# Patient Record
Sex: MALE | Race: WHITE | Employment: OTHER | URBAN - METROPOLITAN AREA
[De-identification: names, ages, dates, MRNs, and addresses within clinical notes are randomized per-mention and may not be internally consistent; named-entity substitution may affect disease eponyms.]

---

## 2019-02-02 ENCOUNTER — APPOINTMENT (OUTPATIENT)
Dept: GENERAL RADIOLOGY | Facility: HOSPITAL | Age: 64
End: 2019-02-02
Payer: COMMERCIAL

## 2019-02-02 ENCOUNTER — HOSPITAL ENCOUNTER (OUTPATIENT)
Facility: HOSPITAL | Age: 64
Setting detail: OBSERVATION
Discharge: HOME | End: 2019-02-03
Attending: EMERGENCY MEDICINE | Admitting: HOSPITALIST
Payer: COMMERCIAL

## 2019-02-02 DIAGNOSIS — R07.9 ACUTE CHEST PAIN: Primary | ICD-10-CM

## 2019-02-02 LAB
ANION GAP SERPL CALC-SCNC: 11 MMOL/L (ref 0–18)
BASOPHILS # BLD AUTO: 0.03 X10(3) UL (ref 0–0.2)
BASOPHILS NFR BLD AUTO: 0.4 %
BILIRUB UR QL: NEGATIVE
BUN SERPL-MCNC: 19 MG/DL (ref 8–20)
BUN/CREAT SERPL: 18.1 (ref 10–20)
CALCIUM SERPL-MCNC: 9.7 MG/DL (ref 8.5–10.5)
CHLORIDE SERPL-SCNC: 100 MMOL/L (ref 95–110)
CLARITY UR: CLEAR
CO2 SERPL-SCNC: 23 MMOL/L (ref 22–32)
COLOR UR: YELLOW
CREAT SERPL-MCNC: 1.05 MG/DL (ref 0.5–1.5)
DEPRECATED RDW RBC AUTO: 44.7 FL (ref 35.1–46.3)
EOSINOPHIL # BLD AUTO: 0.06 X10(3) UL (ref 0–0.7)
EOSINOPHIL NFR BLD AUTO: 0.8 %
ERYTHROCYTE [DISTWIDTH] IN BLOOD BY AUTOMATED COUNT: 13.5 % (ref 11–15)
EST. AVERAGE GLUCOSE BLD GHB EST-MCNC: 131 MG/DL (ref 68–126)
GLUCOSE BLDC GLUCOMTR-MCNC: 100 MG/DL (ref 70–99)
GLUCOSE BLDC GLUCOMTR-MCNC: 100 MG/DL (ref 70–99)
GLUCOSE SERPL-MCNC: 143 MG/DL (ref 70–99)
GLUCOSE UR-MCNC: NEGATIVE MG/DL
HBA1C MFR BLD HPLC: 6.2 % (ref ?–5.7)
HCT VFR BLD AUTO: 43.8 % (ref 39–53)
HGB BLD-MCNC: 14.8 G/DL (ref 13–17.5)
HGB UR QL STRIP.AUTO: NEGATIVE
IMM GRANULOCYTES # BLD AUTO: 0.02 X10(3) UL (ref 0–1)
IMM GRANULOCYTES NFR BLD: 0.3 %
KETONES UR-MCNC: NEGATIVE MG/DL
LEUKOCYTE ESTERASE UR QL STRIP.AUTO: NEGATIVE
LYMPHOCYTES # BLD AUTO: 1.54 X10(3) UL (ref 1–4)
LYMPHOCYTES NFR BLD AUTO: 21.3 %
MCH RBC QN AUTO: 30.5 PG (ref 26–34)
MCHC RBC AUTO-ENTMCNC: 33.8 G/DL (ref 31–37)
MCV RBC AUTO: 90.1 FL (ref 80–100)
MONOCYTES # BLD AUTO: 0.48 X10(3) UL (ref 0.1–1)
MONOCYTES NFR BLD AUTO: 6.6 %
NEUTROPHILS # BLD AUTO: 5.09 X10 (3) UL (ref 1.5–7.7)
NEUTROPHILS # BLD AUTO: 5.09 X10(3) UL (ref 1.5–7.7)
NEUTROPHILS NFR BLD AUTO: 70.6 %
NITRITE UR QL STRIP.AUTO: NEGATIVE
OSMOLALITY UR CALC.SUM OF ELEC: 283 MOSM/KG (ref 275–295)
PH UR: 5 [PH] (ref 5–8)
PLATELET # BLD AUTO: 212 10(3)UL (ref 150–450)
POTASSIUM SERPL-SCNC: 4.1 MMOL/L (ref 3.3–5.1)
PROT UR-MCNC: NEGATIVE MG/DL
RBC # BLD AUTO: 4.86 X10(6)UL (ref 3.8–5.8)
SODIUM SERPL-SCNC: 134 MMOL/L (ref 136–144)
SP GR UR STRIP: 1.02 (ref 1–1.03)
TROPONIN I SERPL-MCNC: 0 NG/ML (ref ?–0.03)
TROPONIN I SERPL-MCNC: 0 NG/ML (ref ?–0.03)
UROBILINOGEN UR STRIP-ACNC: <2
VIT C UR-MCNC: NEGATIVE MG/DL
WBC # BLD AUTO: 7.2 X10(3) UL (ref 4–11)

## 2019-02-02 PROCEDURE — 80048 BASIC METABOLIC PNL TOTAL CA: CPT

## 2019-02-02 PROCEDURE — 99285 EMERGENCY DEPT VISIT HI MDM: CPT

## 2019-02-02 PROCEDURE — 93010 ELECTROCARDIOGRAM REPORT: CPT | Performed by: EMERGENCY MEDICINE

## 2019-02-02 PROCEDURE — 36415 COLL VENOUS BLD VENIPUNCTURE: CPT

## 2019-02-02 PROCEDURE — 82962 GLUCOSE BLOOD TEST: CPT

## 2019-02-02 PROCEDURE — 84484 ASSAY OF TROPONIN QUANT: CPT | Performed by: EMERGENCY MEDICINE

## 2019-02-02 PROCEDURE — 71045 X-RAY EXAM CHEST 1 VIEW: CPT

## 2019-02-02 PROCEDURE — 80048 BASIC METABOLIC PNL TOTAL CA: CPT | Performed by: EMERGENCY MEDICINE

## 2019-02-02 PROCEDURE — 83036 HEMOGLOBIN GLYCOSYLATED A1C: CPT | Performed by: HOSPITALIST

## 2019-02-02 PROCEDURE — 84484 ASSAY OF TROPONIN QUANT: CPT

## 2019-02-02 PROCEDURE — 93005 ELECTROCARDIOGRAM TRACING: CPT

## 2019-02-02 PROCEDURE — 85025 COMPLETE CBC W/AUTO DIFF WBC: CPT

## 2019-02-02 PROCEDURE — 96372 THER/PROPH/DIAG INJ SC/IM: CPT

## 2019-02-02 PROCEDURE — 81003 URINALYSIS AUTO W/O SCOPE: CPT | Performed by: EMERGENCY MEDICINE

## 2019-02-02 PROCEDURE — 85025 COMPLETE CBC W/AUTO DIFF WBC: CPT | Performed by: EMERGENCY MEDICINE

## 2019-02-02 RX ORDER — ENOXAPARIN SODIUM 100 MG/ML
40 INJECTION SUBCUTANEOUS NIGHTLY
Status: DISCONTINUED | OUTPATIENT
Start: 2019-02-02 | End: 2019-02-03

## 2019-02-02 RX ORDER — ATORVASTATIN CALCIUM 10 MG/1
10 TABLET, FILM COATED ORAL NIGHTLY
Status: DISCONTINUED | OUTPATIENT
Start: 2019-02-02 | End: 2019-02-03

## 2019-02-02 RX ORDER — SERTRALINE HYDROCHLORIDE 100 MG/1
100 TABLET, FILM COATED ORAL DAILY
Status: DISCONTINUED | OUTPATIENT
Start: 2019-02-03 | End: 2019-02-03

## 2019-02-02 RX ORDER — SODIUM CHLORIDE 0.9 % (FLUSH) 0.9 %
3 SYRINGE (ML) INJECTION AS NEEDED
Status: DISCONTINUED | OUTPATIENT
Start: 2019-02-02 | End: 2019-02-03

## 2019-02-02 RX ORDER — SERTRALINE HYDROCHLORIDE 100 MG/1
100 TABLET, FILM COATED ORAL DAILY
COMMUNITY

## 2019-02-02 RX ORDER — ASPIRIN 81 MG/1
324 TABLET, CHEWABLE ORAL ONCE
Status: COMPLETED | OUTPATIENT
Start: 2019-02-02 | End: 2019-02-02

## 2019-02-02 RX ORDER — ALFUZOSIN HYDROCHLORIDE 10 MG/1
10 TABLET, EXTENDED RELEASE ORAL
Status: DISCONTINUED | OUTPATIENT
Start: 2019-02-03 | End: 2019-02-03

## 2019-02-02 RX ORDER — TAMSULOSIN HYDROCHLORIDE 0.4 MG/1
0.4 CAPSULE ORAL DAILY
Status: ON HOLD | COMMUNITY
End: 2019-03-19

## 2019-02-02 RX ORDER — ZOLPIDEM TARTRATE 5 MG/1
10 TABLET ORAL NIGHTLY
Status: DISCONTINUED | OUTPATIENT
Start: 2019-02-02 | End: 2019-02-03

## 2019-02-02 RX ORDER — DEXTROSE MONOHYDRATE 25 G/50ML
50 INJECTION, SOLUTION INTRAVENOUS AS NEEDED
Status: DISCONTINUED | OUTPATIENT
Start: 2019-02-02 | End: 2019-02-03

## 2019-02-02 RX ORDER — ATORVASTATIN CALCIUM 10 MG/1
10 TABLET, FILM COATED ORAL NIGHTLY
Status: ON HOLD | COMMUNITY
End: 2019-04-24

## 2019-02-02 RX ORDER — ZOLPIDEM TARTRATE 10 MG/1
10 TABLET ORAL NIGHTLY
COMMUNITY

## 2019-02-02 RX ORDER — TRAMADOL HYDROCHLORIDE 50 MG/1
50 TABLET ORAL EVERY 6 HOURS PRN
Status: DISCONTINUED | OUTPATIENT
Start: 2019-02-02 | End: 2019-02-03

## 2019-02-02 RX ORDER — FINASTERIDE 5 MG/1
5 TABLET, FILM COATED ORAL DAILY
Status: DISCONTINUED | OUTPATIENT
Start: 2019-02-03 | End: 2019-02-03

## 2019-02-02 RX ORDER — ENALAPRIL MALEATE 20 MG/1
20 TABLET ORAL 2 TIMES DAILY
Status: DISCONTINUED | OUTPATIENT
Start: 2019-02-02 | End: 2019-02-03

## 2019-02-02 NOTE — ED NOTES
Pt here today for upper and mid anterior wall chest pain and tightness for the last few hours. Pt states he has been moving a lot of heavy bags of salt and amina decorations when he began to have chest pain, dizziness, SOB.  Pt states he had angio last

## 2019-02-02 NOTE — H&P
[facility] HOSPITALIST HISTORY AND PHYSICAL     CC: Patient presents with:  Chest Pain Angina (cardiovascular)       PCP: Kelly Gonzalez MD- non [facility]    History of Present Illness:   Patient is a 70year old male with PMH sig for CAD?  (pt unable to te affect      LABS:   Lab Results   Component Value Date    WBC 7.2 02/02/2019    HGB 14.8 02/02/2019    HCT 43.8 02/02/2019    .0 02/02/2019    CREATSERUM 1.05 02/02/2019    BUN 19 02/02/2019     02/02/2019    K 4.1 02/02/2019     02/02/2 alternating sx constipation and loose stools at bsl      Outpatient records or previous hospital records reviewed as detailed above.     [facility] hospitalist to continue to follow patient while in house        Leonides Newton  [facility] Hospitalist  Pager [phone]    2

## 2019-02-02 NOTE — ED INITIAL ASSESSMENT (HPI)
Mid-sternal chest tightness and difficulty breathing x1-2 hours. States he has been working hard this week clearing snow and putting away [employer].

## 2019-02-02 NOTE — ED PROVIDER NOTES
Patient Seen in: [facility] Emergency Department    History   Patient presents with:  Chest Pain Angina (cardiovascular)    Stated Complaint: SOB    HPI    68-year-old male with past medical history of coronary artery disease hypertension diabetes Drug use: Not on file      Review of Systems   Constitutional: Negative. HENT: Negative. Eyes: Negative. Respiratory: Positive for shortness of breath. Cardiovascular: Positive for chest pain. Gastrointestinal: Negative.     Genitourinary: Po mood and affect. His speech is normal and behavior is normal.   Nursing note and vitals reviewed.             ED Course     Labs Reviewed   BASIC METABOLIC PANEL (8) - Abnormal; Notable for the following components:       Result Value    Glucose 143 (*) COMPARISON: None. INDICATIONS: Shortness of breath and a chest pain  for 2 hours  TECHNIQUE:   Single view. FINDINGS:  CARDIAC/VASC: Normal.  No cardiac silhouette abnormality or cardiomegaly. Unremarkable pulmonary vasculature.   MEDIAST/EARLE:   No vis

## 2019-02-03 ENCOUNTER — APPOINTMENT (OUTPATIENT)
Dept: NUCLEAR MEDICINE | Facility: HOSPITAL | Age: 64
End: 2019-02-03
Attending: HOSPITALIST
Payer: COMMERCIAL

## 2019-02-03 ENCOUNTER — APPOINTMENT (OUTPATIENT)
Dept: CV DIAGNOSTICS | Facility: HOSPITAL | Age: 64
End: 2019-02-03
Attending: HOSPITALIST
Payer: COMMERCIAL

## 2019-02-03 VITALS
HEART RATE: 58 BPM | TEMPERATURE: 98 F | HEIGHT: 70 IN | SYSTOLIC BLOOD PRESSURE: 124 MMHG | DIASTOLIC BLOOD PRESSURE: 65 MMHG | RESPIRATION RATE: 18 BRPM | OXYGEN SATURATION: 97 % | WEIGHT: 206.88 LBS | BODY MASS INDEX: 29.62 KG/M2

## 2019-02-03 LAB
ANION GAP SERPL CALC-SCNC: 10 MMOL/L (ref 0–18)
BUN SERPL-MCNC: 14 MG/DL (ref 8–20)
BUN/CREAT SERPL: 13 (ref 10–20)
CALCIUM SERPL-MCNC: 9.5 MG/DL (ref 8.5–10.5)
CHLORIDE SERPL-SCNC: 101 MMOL/L (ref 95–110)
CO2 SERPL-SCNC: 26 MMOL/L (ref 22–32)
CREAT SERPL-MCNC: 1.08 MG/DL (ref 0.5–1.5)
GLUCOSE BLDC GLUCOMTR-MCNC: 101 MG/DL (ref 70–99)
GLUCOSE SERPL-MCNC: 111 MG/DL (ref 70–99)
OSMOLALITY UR CALC.SUM OF ELEC: 285 MOSM/KG (ref 275–295)
POTASSIUM SERPL-SCNC: 4.4 MMOL/L (ref 3.3–5.1)
SODIUM SERPL-SCNC: 137 MMOL/L (ref 136–144)

## 2019-02-03 PROCEDURE — 94660 CPAP INITIATION&MGMT: CPT

## 2019-02-03 PROCEDURE — 93018 CV STRESS TEST I&R ONLY: CPT | Performed by: HOSPITALIST

## 2019-02-03 PROCEDURE — 93017 CV STRESS TEST TRACING ONLY: CPT | Performed by: HOSPITALIST

## 2019-02-03 PROCEDURE — 80048 BASIC METABOLIC PNL TOTAL CA: CPT | Performed by: HOSPITALIST

## 2019-02-03 PROCEDURE — 78452 HT MUSCLE IMAGE SPECT MULT: CPT | Performed by: HOSPITALIST

## 2019-02-03 PROCEDURE — 82962 GLUCOSE BLOOD TEST: CPT

## 2019-02-03 PROCEDURE — 93016 CV STRESS TEST SUPVJ ONLY: CPT | Performed by: HOSPITALIST

## 2019-02-03 RX ORDER — 0.9 % SODIUM CHLORIDE 0.9 %
VIAL (ML) INJECTION
Status: COMPLETED
Start: 2019-02-03 | End: 2019-02-03

## 2019-02-03 NOTE — PLAN OF CARE
Ok to discharge patient home. Discharge instructions explained to patient. Tele and IV discontinued. Patient sent home with family.

## 2019-02-03 NOTE — DISCHARGE SUMMARY
[facility] Internal Medicine Discharge Summary   Patient ID:  Marii Hollins  [id number]  89 year old  6/24/1955    Admit date: 2/2/2019    Discharge date and time: 2/3/2019     Attending Physician: Madison Fofana MD     Primary Care Physician: Kelly Borjas as:   ZOLOFT     tamsulosin HCl 0.4 MG Caps  Commonly known as:  FLOMAX     VERAPAMIL HCL ER OR     Zolpidem Tartrate 10 MG Tabs  Commonly known as:  AMBIEN            Consults: IP CONSULT TO HOSPITALIST    Radiology: Xr Chest Ap Portable  (cpt=71045)    Re

## 2019-02-07 ENCOUNTER — OFFICE VISIT (OUTPATIENT)
Dept: SURGERY | Facility: CLINIC | Age: 64
End: 2019-02-07
Payer: COMMERCIAL

## 2019-02-07 ENCOUNTER — TELEPHONE (OUTPATIENT)
Dept: SURGERY | Facility: CLINIC | Age: 64
End: 2019-02-07

## 2019-02-07 VITALS
SYSTOLIC BLOOD PRESSURE: 108 MMHG | TEMPERATURE: 98 F | HEART RATE: 72 BPM | DIASTOLIC BLOOD PRESSURE: 66 MMHG | WEIGHT: 208 LBS | BODY MASS INDEX: 29.78 KG/M2 | HEIGHT: 70 IN

## 2019-02-07 DIAGNOSIS — R35.0 URINARY FREQUENCY: ICD-10-CM

## 2019-02-07 DIAGNOSIS — R35.1 BENIGN PROSTATIC HYPERPLASIA WITH NOCTURIA: Primary | ICD-10-CM

## 2019-02-07 DIAGNOSIS — R30.0 DYSURIA: ICD-10-CM

## 2019-02-07 DIAGNOSIS — N40.1 BENIGN PROSTATIC HYPERPLASIA WITH NOCTURIA: Primary | ICD-10-CM

## 2019-02-07 DIAGNOSIS — R39.12 WEAK URINARY STREAM: ICD-10-CM

## 2019-02-07 DIAGNOSIS — R39.13 INTERMITTENT URINARY STREAM: ICD-10-CM

## 2019-02-07 DIAGNOSIS — R82.90 URINE FINDING: ICD-10-CM

## 2019-02-07 DIAGNOSIS — R39.15 URINARY URGENCY: ICD-10-CM

## 2019-02-07 PROBLEM — E11.9 TYPE 2 DIABETES MELLITUS (HCC): Status: ACTIVE | Noted: 2019-02-07

## 2019-02-07 PROBLEM — E78.5 HYPERLIPIDEMIA: Status: ACTIVE | Noted: 2019-02-07

## 2019-02-07 LAB
APPEARANCE: CLEAR
BACTERIA UR QL AUTO: NEGATIVE /HPF
BILIRUB UR QL: NEGATIVE
CLARITY UR: CLEAR
COLOR UR: YELLOW
GLUCOSE UR-MCNC: NEGATIVE MG/DL
KETONES UR-MCNC: NEGATIVE MG/DL
LEUKOCYTE ESTERASE UR QL STRIP.AUTO: NEGATIVE
MULTISTIX LOT#: NORMAL NUMERIC
NITRITE UR QL STRIP.AUTO: NEGATIVE
PH UR: 5 [PH] (ref 5–8)
PH, URINE: 5 (ref 4.5–8)
PROT UR-MCNC: NEGATIVE MG/DL
RBC #/AREA URNS AUTO: <1 /HPF
SP GR UR STRIP: 1.02 (ref 1–1.03)
SPECIFIC GRAVITY: 1.01 (ref 1–1.03)
URINE-COLOR: YELLOW
UROBILINOGEN UR STRIP-ACNC: <2
UROBILINOGEN,SEMI-QN: 0.2 MG/DL (ref 0–1.9)
VIT C UR-MCNC: NEGATIVE MG/DL
WBC #/AREA URNS AUTO: <1 /HPF

## 2019-02-07 PROCEDURE — 81003 URINALYSIS AUTO W/O SCOPE: CPT | Performed by: NURSE PRACTITIONER

## 2019-02-07 PROCEDURE — 99244 OFF/OP CNSLTJ NEW/EST MOD 40: CPT | Performed by: NURSE PRACTITIONER

## 2019-02-07 NOTE — TELEPHONE ENCOUNTER
Called patients PCP Dr. Juhi Ventura, and spoke with Enriqueta. Requested PSA results and gave [facility] THE [facility] Fax#. Received fax results and one copy placed in scan bin and second copy given to [address] [place] [address].

## 2019-02-07 NOTE — PROGRESS NOTES
HPI:    Patient ID: Julián Honeycutt is a 70year old male. Patient is a 70year old male who presents to the clinic for a consult regarding urinary frequency. Past medical history of diabetes mellitus type 2, CAD, hypertension, and BPH.     Patient state Negative for abdominal pain, constipation and diarrhea. Genitourinary: Positive for difficulty urinating, dysuria, frequency, penile pain and urgency. Negative for flank pain, hematuria, scrotal swelling and testicular pain.    Musculoskeletal: Negative f normal. No respiratory distress. Abdominal: Soft. He exhibits no distension. Genitourinary:   Genitourinary Comments: Circumcised penis normal, no swelling or erythema.     Urethral opening normal.  No scrotal swelling  Bilateral testes palpable and nor

## 2019-02-08 ENCOUNTER — TELEPHONE (OUTPATIENT)
Dept: SURGERY | Facility: CLINIC | Age: 64
End: 2019-02-08

## 2019-02-08 NOTE — TELEPHONE ENCOUNTER
----- Message from WARREN Tan sent at 2/8/2019  8:48 AM CST -----  Staff,    Please let patient know his UA is normal.  Microscopic evaluation does not show an abnormal amount of RBCs. Continue with current plans for uroflow.      Thank you,

## 2019-02-22 ENCOUNTER — TELEPHONE (OUTPATIENT)
Dept: SURGERY | Facility: CLINIC | Age: 64
End: 2019-02-22

## 2019-02-22 NOTE — TELEPHONE ENCOUNTER
Patient contacted. Offered sooner appt for Uroflow/bladder US on Wed @ 10:30 am. Patient agreed, appointment changed.

## 2019-02-22 NOTE — TELEPHONE ENCOUNTER
Pt states that he is having problems urinating, and wants to know if he can move his 3/13/19 Uroflow Bladder US appt. To a sooner date?

## 2019-02-27 ENCOUNTER — OFFICE VISIT (OUTPATIENT)
Dept: SURGERY | Facility: CLINIC | Age: 64
End: 2019-02-27
Payer: MEDICARE

## 2019-02-27 DIAGNOSIS — R39.15 URINARY URGENCY: ICD-10-CM

## 2019-02-27 DIAGNOSIS — R35.0 URINARY FREQUENCY: ICD-10-CM

## 2019-02-27 DIAGNOSIS — R39.12 BENIGN PROSTATIC HYPERPLASIA WITH WEAK URINARY STREAM: Primary | ICD-10-CM

## 2019-02-27 DIAGNOSIS — N40.1 BENIGN PROSTATIC HYPERPLASIA WITH WEAK URINARY STREAM: Primary | ICD-10-CM

## 2019-02-27 PROCEDURE — 99212 OFFICE O/P EST SF 10 MIN: CPT | Performed by: NURSE PRACTITIONER

## 2019-02-27 PROCEDURE — G0463 HOSPITAL OUTPT CLINIC VISIT: HCPCS | Performed by: NURSE PRACTITIONER

## 2019-02-27 NOTE — PROGRESS NOTES
HPI:    Patient ID: Lance Walker is a 70year old male. Patient presents to the clinic today for a follow up regarding voiding dysfunction.     Patient last seen in the office on 2/7/19 with complaints of urinary frequency, urgency, dysuria, and inter history on file.    Social History: Social History    Tobacco Use      Smoking status: Former Smoker        Years: 20.00        Types: Cigarettes        Quit date:         Years since quittin.1      Smokeless tobacco: Never Used    Alcohol use: Not placed in this encounter.       Meds This Visit:  Requested Prescriptions      No prescriptions requested or ordered in this encounter       Imaging & Referrals:  None        FA#[id number]

## 2019-03-07 ENCOUNTER — OFFICE VISIT (OUTPATIENT)
Dept: SURGERY | Facility: CLINIC | Age: 64
End: 2019-03-07
Payer: MEDICARE

## 2019-03-07 ENCOUNTER — TELEPHONE (OUTPATIENT)
Dept: SURGERY | Facility: CLINIC | Age: 64
End: 2019-03-07

## 2019-03-07 DIAGNOSIS — R39.12 BENIGN PROSTATIC HYPERPLASIA WITH WEAK URINARY STREAM: Primary | ICD-10-CM

## 2019-03-07 DIAGNOSIS — N40.1 BENIGN PROSTATIC HYPERPLASIA WITH WEAK URINARY STREAM: Primary | ICD-10-CM

## 2019-03-07 DIAGNOSIS — R35.0 URINARY FREQUENCY: ICD-10-CM

## 2019-03-07 DIAGNOSIS — R39.15 URINARY URGENCY: ICD-10-CM

## 2019-03-07 PROCEDURE — 51798 US URINE CAPACITY MEASURE: CPT | Performed by: NURSE PRACTITIONER

## 2019-03-07 PROCEDURE — 99213 OFFICE O/P EST LOW 20 MIN: CPT | Performed by: NURSE PRACTITIONER

## 2019-03-07 PROCEDURE — 51741 ELECTRO-UROFLOWMETRY FIRST: CPT | Performed by: NURSE PRACTITIONER

## 2019-03-07 NOTE — TELEPHONE ENCOUNTER
Called patient in regards to appointment scheduled for uroflow today @ at 1pm no answer left message to come in at 130pm due to there being another patient scheduled at 1pm also.

## 2019-03-07 NOTE — TELEPHONE ENCOUNTER
PT seen if office today for Uroflow with [address]. PT following up with Fredy Metz 3/11 to discuss further options. Copy of uroflow results in black folder.      Future Appointments   Date Time Provider Marielle Schafer   3/11/2019  2:00 PM Patria Schmidt MD no

## 2019-03-07 NOTE — PROGRESS NOTES
HPI:    Patient ID: Juliet Wang is a 70year old male. Patient is a 70year old male who presents to the clinic for a uroflow and bladder ultrasound. Patient was previously seen in the clinic 2/7/19 with complaints of voiding dysfunction.   He c FRACTURE SURGERY      R heel      No family history on file.    Social History: Social History    Tobacco Use      Smoking status: Former Smoker        Years: 20.00        Types: Cigarettes        Quit date:         Years since quittin.      Smoke this encounter       Imaging & Referrals:  None        OE#[id number]

## 2019-03-11 ENCOUNTER — APPOINTMENT (OUTPATIENT)
Dept: LAB | Facility: HOSPITAL | Age: 64
End: 2019-03-11
Attending: UROLOGY
Payer: COMMERCIAL

## 2019-03-11 ENCOUNTER — OFFICE VISIT (OUTPATIENT)
Dept: SURGERY | Facility: CLINIC | Age: 64
End: 2019-03-11
Payer: MEDICARE

## 2019-03-11 VITALS
BODY MASS INDEX: 30.35 KG/M2 | TEMPERATURE: 98 F | DIASTOLIC BLOOD PRESSURE: 73 MMHG | HEIGHT: 70 IN | WEIGHT: 212 LBS | HEART RATE: 83 BPM | SYSTOLIC BLOOD PRESSURE: 143 MMHG

## 2019-03-11 DIAGNOSIS — N40.1 BENIGN PROSTATIC HYPERPLASIA WITH LOWER URINARY TRACT SYMPTOMS, SYMPTOM DETAILS UNSPECIFIED: Primary | ICD-10-CM

## 2019-03-11 DIAGNOSIS — N40.1 BENIGN PROSTATIC HYPERPLASIA WITH LOWER URINARY TRACT SYMPTOMS, SYMPTOM DETAILS UNSPECIFIED: ICD-10-CM

## 2019-03-11 PROCEDURE — G0463 HOSPITAL OUTPT CLINIC VISIT: HCPCS | Performed by: UROLOGY

## 2019-03-11 PROCEDURE — 99204 OFFICE O/P NEW MOD 45 MIN: CPT | Performed by: UROLOGY

## 2019-03-11 PROCEDURE — 36415 COLL VENOUS BLD VENIPUNCTURE: CPT

## 2019-03-11 PROCEDURE — 84153 ASSAY OF PSA TOTAL: CPT

## 2019-03-11 NOTE — PROGRESS NOTES
SUBJECTIVE:  Annemarie Ortega is a 70year old male who presents for a consultation at the request of, and a copy of this note will be sent to, [facility], for evaluation of  benign prostatic hyperplasia. He states that the problem is unchanged.  Viewbix pleurisy. CARDIOVASCULAR:  Negative for pain or chest discomfort, dizziness or fainting, palpitations, leg swelling, nocturia, or claudication.   GASTROINTESTINAL:  Negative for nausea, vomiting, diarrhea, constipation, heartburn or indigestion, abdominal discussed with the patient and his wife and they understand.     Meds This Visit:  Requested Prescriptions      No prescriptions requested or ordered in this encounter       Imaging & Referrals:  None

## 2019-03-18 ENCOUNTER — APPOINTMENT (OUTPATIENT)
Dept: CT IMAGING | Facility: HOSPITAL | Age: 64
End: 2019-03-18
Attending: EMERGENCY MEDICINE
Payer: COMMERCIAL

## 2019-03-18 ENCOUNTER — APPOINTMENT (OUTPATIENT)
Dept: GENERAL RADIOLOGY | Facility: HOSPITAL | Age: 64
End: 2019-03-18
Attending: EMERGENCY MEDICINE
Payer: COMMERCIAL

## 2019-03-18 ENCOUNTER — APPOINTMENT (OUTPATIENT)
Dept: ULTRASOUND IMAGING | Facility: HOSPITAL | Age: 64
End: 2019-03-18
Attending: Other
Payer: COMMERCIAL

## 2019-03-18 ENCOUNTER — HOSPITAL ENCOUNTER (OUTPATIENT)
Facility: HOSPITAL | Age: 64
Setting detail: OBSERVATION
Discharge: HOME | End: 2019-03-19
Attending: EMERGENCY MEDICINE | Admitting: HOSPITALIST
Payer: COMMERCIAL

## 2019-03-18 ENCOUNTER — APPOINTMENT (OUTPATIENT)
Dept: MRI IMAGING | Facility: HOSPITAL | Age: 64
End: 2019-03-18
Attending: Other
Payer: COMMERCIAL

## 2019-03-18 DIAGNOSIS — R53.1 WEAKNESS: Primary | ICD-10-CM

## 2019-03-18 DIAGNOSIS — R11.0 NAUSEA: ICD-10-CM

## 2019-03-18 DIAGNOSIS — R33.9 URINARY RETENTION: ICD-10-CM

## 2019-03-18 PROBLEM — E87.1 HYPONATREMIA: Status: ACTIVE | Noted: 2019-03-18

## 2019-03-18 PROBLEM — R73.9 HYPERGLYCEMIA: Status: ACTIVE | Noted: 2019-03-18

## 2019-03-18 LAB
ANION GAP SERPL CALC-SCNC: 8 MMOL/L (ref 0–18)
BASOPHILS # BLD AUTO: 0.04 X10(3) UL (ref 0–0.2)
BASOPHILS NFR BLD AUTO: 0.5 %
BILIRUB UR QL: NEGATIVE
BUN BLD-MCNC: 11 MG/DL (ref 7–18)
BUN/CREAT SERPL: 11.3 (ref 10–20)
CALCIUM BLD-MCNC: 9.3 MG/DL (ref 8.5–10.1)
CHLORIDE SERPL-SCNC: 103 MMOL/L (ref 98–107)
CHOLEST SMN-MCNC: 112 MG/DL (ref ?–200)
CK SERPL-CCNC: 112 U/L (ref 39–308)
CLARITY UR: CLEAR
CO2 SERPL-SCNC: 24 MMOL/L (ref 21–32)
COLOR UR: YELLOW
CREAT BLD-MCNC: 0.97 MG/DL (ref 0.7–1.3)
DEPRECATED RDW RBC AUTO: 42.1 FL (ref 35.1–46.3)
EOSINOPHIL # BLD AUTO: 0.04 X10(3) UL (ref 0–0.7)
EOSINOPHIL NFR BLD AUTO: 0.5 %
ERYTHROCYTE [DISTWIDTH] IN BLOOD BY AUTOMATED COUNT: 13.2 % (ref 11–15)
EST. AVERAGE GLUCOSE BLD GHB EST-MCNC: 131 MG/DL (ref 68–126)
GLUCOSE BLD-MCNC: 136 MG/DL (ref 70–99)
GLUCOSE BLDC GLUCOMTR-MCNC: 137 MG/DL (ref 70–99)
GLUCOSE BLDC GLUCOMTR-MCNC: 96 MG/DL (ref 70–99)
GLUCOSE BLDC GLUCOMTR-MCNC: 98 MG/DL (ref 70–99)
GLUCOSE UR-MCNC: NEGATIVE MG/DL
HBA1C MFR BLD HPLC: 6.2 % (ref ?–5.7)
HCT VFR BLD AUTO: 42.6 % (ref 39–53)
HDLC SERPL-MCNC: 52 MG/DL (ref 40–59)
HGB BLD-MCNC: 15.2 G/DL (ref 13–17.5)
HGB UR QL STRIP.AUTO: NEGATIVE
IMM GRANULOCYTES # BLD AUTO: 0.03 X10(3) UL (ref 0–1)
IMM GRANULOCYTES NFR BLD: 0.4 %
INR BLD: 1.09 (ref 0.9–1.2)
LDLC SERPL CALC-MCNC: 37 MG/DL (ref ?–100)
LEUKOCYTE ESTERASE UR QL STRIP.AUTO: NEGATIVE
LYMPHOCYTES # BLD AUTO: 1.6 X10(3) UL (ref 1–4)
LYMPHOCYTES NFR BLD AUTO: 20.3 %
MCH RBC QN AUTO: 31.1 PG (ref 26–34)
MCHC RBC AUTO-ENTMCNC: 35.7 G/DL (ref 31–37)
MCV RBC AUTO: 87.1 FL (ref 80–100)
MONOCYTES # BLD AUTO: 0.65 X10(3) UL (ref 0.1–1)
MONOCYTES NFR BLD AUTO: 8.2 %
NEUTROPHILS # BLD AUTO: 5.52 X10 (3) UL (ref 1.5–7.7)
NEUTROPHILS # BLD AUTO: 5.52 X10(3) UL (ref 1.5–7.7)
NEUTROPHILS NFR BLD AUTO: 70.1 %
NITRITE UR QL STRIP.AUTO: NEGATIVE
NONHDLC SERPL-MCNC: 60 MG/DL (ref ?–130)
OSMOLALITY SERPL CALC.SUM OF ELEC: 281 MOSM/KG (ref 275–295)
PH UR: 5 [PH] (ref 5–8)
PLATELET # BLD AUTO: 228 10(3)UL (ref 150–450)
POTASSIUM SERPL-SCNC: 3.8 MMOL/L (ref 3.5–5.1)
PROT UR-MCNC: NEGATIVE MG/DL
PROTHROMBIN TIME: 13.9 SECONDS (ref 11.8–14.5)
RBC # BLD AUTO: 4.89 X10(6)UL (ref 3.8–5.8)
SODIUM SERPL-SCNC: 135 MMOL/L (ref 136–145)
SP GR UR STRIP: 1.01 (ref 1–1.03)
TRIGL SERPL-MCNC: 113 MG/DL (ref 30–149)
TROPONIN I SERPL-MCNC: <0.045 NG/ML (ref ?–0.04)
TSI SER-ACNC: 2.19 MIU/ML (ref 0.36–3.74)
UROBILINOGEN UR STRIP-ACNC: <2
VIT B12 SERPL-MCNC: 152 PG/ML (ref 193–986)
VIT C UR-MCNC: NEGATIVE MG/DL
VLDLC SERPL CALC-MCNC: 23 MG/DL (ref 0–30)
WBC # BLD AUTO: 7.9 X10(3) UL (ref 4–11)

## 2019-03-18 PROCEDURE — 70551 MRI BRAIN STEM W/O DYE: CPT | Performed by: OTHER

## 2019-03-18 PROCEDURE — 71045 X-RAY EXAM CHEST 1 VIEW: CPT | Performed by: EMERGENCY MEDICINE

## 2019-03-18 PROCEDURE — 99203 OFFICE O/P NEW LOW 30 MIN: CPT | Performed by: OTHER

## 2019-03-18 PROCEDURE — 72141 MRI NECK SPINE W/O DYE: CPT | Performed by: OTHER

## 2019-03-18 PROCEDURE — 70450 CT HEAD/BRAIN W/O DYE: CPT | Performed by: EMERGENCY MEDICINE

## 2019-03-18 PROCEDURE — 93880 EXTRACRANIAL BILAT STUDY: CPT | Performed by: OTHER

## 2019-03-18 RX ORDER — ONDANSETRON 2 MG/ML
4 INJECTION INTRAMUSCULAR; INTRAVENOUS EVERY 6 HOURS PRN
Status: DISCONTINUED | OUTPATIENT
Start: 2019-03-18 | End: 2019-03-19

## 2019-03-18 RX ORDER — ENALAPRIL MALEATE 20 MG/1
20 TABLET ORAL 2 TIMES DAILY
Status: DISCONTINUED | OUTPATIENT
Start: 2019-03-18 | End: 2019-03-19

## 2019-03-18 RX ORDER — ZOLPIDEM TARTRATE 10 MG/1
10 TABLET ORAL NIGHTLY
Status: DISCONTINUED | OUTPATIENT
Start: 2019-03-18 | End: 2019-03-19

## 2019-03-18 RX ORDER — SERTRALINE HYDROCHLORIDE 100 MG/1
100 TABLET, FILM COATED ORAL DAILY
Status: DISCONTINUED | OUTPATIENT
Start: 2019-03-18 | End: 2019-03-19

## 2019-03-18 RX ORDER — SENNOSIDES 8.6 MG
17.2 TABLET ORAL NIGHTLY
Status: DISCONTINUED | OUTPATIENT
Start: 2019-03-18 | End: 2019-03-19

## 2019-03-18 RX ORDER — VERAPAMIL HYDROCHLORIDE 240 MG/1
240 TABLET, FILM COATED, EXTENDED RELEASE ORAL DAILY
Status: DISCONTINUED | OUTPATIENT
Start: 2019-03-19 | End: 2019-03-19

## 2019-03-18 RX ORDER — ASPIRIN 325 MG
325 TABLET ORAL DAILY
Status: DISCONTINUED | OUTPATIENT
Start: 2019-03-18 | End: 2019-03-19

## 2019-03-18 RX ORDER — HEPARIN SODIUM 5000 [USP'U]/ML
5000 INJECTION, SOLUTION INTRAVENOUS; SUBCUTANEOUS EVERY 12 HOURS SCHEDULED
Status: DISCONTINUED | OUTPATIENT
Start: 2019-03-18 | End: 2019-03-19

## 2019-03-18 RX ORDER — FINASTERIDE 5 MG/1
5 TABLET, FILM COATED ORAL DAILY
Status: DISCONTINUED | OUTPATIENT
Start: 2019-03-18 | End: 2019-03-19

## 2019-03-18 RX ORDER — LORAZEPAM 2 MG/ML
0.5 INJECTION INTRAMUSCULAR ONCE
Status: COMPLETED | OUTPATIENT
Start: 2019-03-18 | End: 2019-03-18

## 2019-03-18 RX ORDER — DEXTROSE MONOHYDRATE 25 G/50ML
50 INJECTION, SOLUTION INTRAVENOUS AS NEEDED
Status: DISCONTINUED | OUTPATIENT
Start: 2019-03-18 | End: 2019-03-19

## 2019-03-18 RX ORDER — ATORVASTATIN CALCIUM 10 MG/1
10 TABLET, FILM COATED ORAL NIGHTLY
Status: DISCONTINUED | OUTPATIENT
Start: 2019-03-18 | End: 2019-03-19

## 2019-03-18 RX ORDER — SODIUM CHLORIDE 0.9 % (FLUSH) 0.9 %
3 SYRINGE (ML) INJECTION AS NEEDED
Status: DISCONTINUED | OUTPATIENT
Start: 2019-03-18 | End: 2019-03-19

## 2019-03-18 RX ORDER — ONDANSETRON 2 MG/ML
4 INJECTION INTRAMUSCULAR; INTRAVENOUS ONCE
Status: COMPLETED | OUTPATIENT
Start: 2019-03-18 | End: 2019-03-18

## 2019-03-18 RX ORDER — ACETAMINOPHEN 325 MG/1
650 TABLET ORAL EVERY 6 HOURS PRN
Status: DISCONTINUED | OUTPATIENT
Start: 2019-03-18 | End: 2019-03-19

## 2019-03-18 NOTE — ED INITIAL ASSESSMENT (HPI)
Pt reports not feeling well he think that he is having a reaction to tamusolin medication that he has been on since December.  He states that experiences nausea, dizziness, weakness in bilateral legs, insomnia, and generalized body aches for the past 2 helen

## 2019-03-18 NOTE — OCCUPATIONAL THERAPY NOTE
OCCUPATIONAL THERAPY EVALUATION - INPATIENT     Room Number: 502/502-A  Evaluation Date: 3/18/2019  Type of Evaluation: Initial  Presenting Problem: (weakness, numbness)    Physician Order: IP Consult to Occupational Therapy  Reason for Therapy: ADL/IADL R heel       HOME SITUATION  Type of Home: House  Home Layout: One level  Lives With: Spouse                      Drives: Yes       Stairs in Home: 3STE  Use of Assistive Device(s): none    Prior Level of [place]: Prior to admission, patient was indep good    FUNCTIONAL ADL ASSESSMENT  Grooming: indep  Feeding: indep  Bathing: indep  Toileting: indep  Upper Extremity Dressing: indep  Lower Extremity Dressing: indep    Education Provided: Educated patient in role of OT and POC  Patient End of Session: Up

## 2019-03-18 NOTE — PHYSICAL THERAPY NOTE
PHYSICAL THERAPY EVALUATION - INPATIENT     Room Number: 502/502-A  Evaluation Date: 3/18/2019  Type of Evaluation: Initial   Physician Order: PT Eval and Treat    Presenting Problem: weakness and numbness  Reason for Therapy: Mobility Dysfunction and Dis RECOMMENDATIONS  PT Discharge Recommendations: Home(recommending initial assist and supervision from family)    PLAN    Patient has been evaluated and presents with no skilled Physical Therapy needs at this time.   Patient will be discharged from Physical T 80            AM-PAC '6-Clicks' INPATIENT SHORT FORM - BASIC MOBILITY  How much difficulty does the patient currently have. ..  -   Turning over in bed (including adjusting bedclothes, sheets and blankets)?: None   -   Sitting down on and standing up from a

## 2019-03-18 NOTE — CONSULTS
[facility] HOSP - [facility]    Report of Consultation    Duy Murcia Patient Status:  Observation    1955 MRN [id number]   Location [facility]5W Attending Ashley Flores MD   Hosp Day # 0 PCP Kelly Gonzalez MD     Date of Admissi ondansetron HCl (ZOFRAN) injection 4 mg 4 mg Intravenous Q6H PRN   atorvastatin (LIPITOR) tab 10 mg 10 mg Oral Nightly   Enalapril Maleate (VASOTEC) tab 20 mg 20 mg Oral BID   finasteride (PROSCAR) tab 5 mg 5 mg Oral Daily   Sertraline HCl (ZOLOFT) tab 1 no cyanosis or edema     Neurological:     Mental Status- Alert and oriented x3.   Normal attention span and concentration  Thought process intact  Memory intact- recent and remote  Mood intact, somewhat anxious  Fund of knowledge appropriate for education 3/18/2019 at 8:34     Approved by (CST): Neville Carr MD on 3/18/2019 at 8:39          Xr Chest Ap Portable  (cpt=71045)    Result Date: 3/18/2019  CONCLUSION:  1. No acute cardiopulmonary disease. 2. No significant change has occurred.      Dictated by (CS

## 2019-03-18 NOTE — ED PROVIDER NOTES
Patient Seen in: [address]    History   Patient presents with:  Dizziness (neurologic)    Stated Complaint: Urinary retention    HPI    Patient presents emergency department complaining of difficulty urinating as well as left-sided weakness and of motion. Neck supple. Cardiovascular: Normal rate and regular rhythm. No murmur heard. Pulmonary/Chest: Effort normal and breath sounds normal. No respiratory distress. Abdominal: Soft. He exhibits no distension. There is no tenderness.    Musculos -----------         ------                     CBC W/ DIFFERENTIAL[[id number]]                              Final result                 Please view results for these tests on the individual orders.    RAINBOW DRAW BLUE   RAINBOW DRAW Jess Bull 3/18/2019 at 14:43          Xr Chest Ap Portable  (cpt=71045)    Result Date: 3/18/2019  CONCLUSION:  1. No acute cardiopulmonary disease. 2. No significant change has occurred.      Dictated by (CST): Mary Kelley MD on 3/18/2019 at 9:07     Approved b

## 2019-03-18 NOTE — PLAN OF CARE
Problem: Patient Centered Care  Goal: Patient preferences are identified and integrated in the patient's plan of care  Interventions:  - What would you like us to know as we care for you? nothing  - Provide timely, complete, and accurate information to pat comfort measures as appropriate  - Advance diet as tolerated, if ordered  - Obtain nutritional consult as needed  - Evaluate fluid balance  Outcome: Progressing    Goal: Maintains adequate nutritional intake (undernourished)  INTERVENTIONS:  - Monitor perc NEUROLOGICAL - ADULT  Goal: Achieves stable or improved neurological status  INTERVENTIONS  - Assess for and report changes in neurological status  - Initiate measures to prevent increased intracranial pressure  - Maintain blood pressure and fluid volume w appropriate  - Consider OT/PT consult to assist with strengthening/mobility  - Encourage toileting schedule  Outcome: Progressing      Problem: Patient/Family Goals  Goal: Patient/Family Long Term Goal  Patient's Long Term Goal: get prostate problem fixed

## 2019-03-18 NOTE — H&P
[facility] Hospitalist H&P       CC: Patient presents with:  Dizziness (neurologic)       PCP: Kelly Gonzalez MD    History of Present Illness: 70year old male with PMH sig for CAD but recent normal stress (2/2019) HTN, DM, prior nicotine use quit Types: Cigarettes        Quit date: 12        Years since quittin.2      Smokeless tobacco: Never Used    Alcohol use: No      Frequency: Never       Fam Hx  History reviewed. No pertinent family history.     Review of Systems  Comprehensive ROS revi on 3/18/2019 at 8:39          Xr Chest Ap Portable  (cpt=71045)    Result Date: 3/18/2019  CONCLUSION:  1. No acute cardiopulmonary disease. 2. No significant change has occurred.      Dictated by (CST): Nadja Pina MD on 3/18/2019 at 9:07     Approved number: [id number]

## 2019-03-19 ENCOUNTER — APPOINTMENT (OUTPATIENT)
Dept: CV DIAGNOSTICS | Facility: HOSPITAL | Age: 64
End: 2019-03-19
Attending: Other
Payer: COMMERCIAL

## 2019-03-19 VITALS
TEMPERATURE: 98 F | HEIGHT: 70 IN | WEIGHT: 183.63 LBS | RESPIRATION RATE: 20 BRPM | DIASTOLIC BLOOD PRESSURE: 63 MMHG | HEART RATE: 64 BPM | BODY MASS INDEX: 26.29 KG/M2 | OXYGEN SATURATION: 97 % | SYSTOLIC BLOOD PRESSURE: 122 MMHG

## 2019-03-19 LAB
ANION GAP SERPL CALC-SCNC: 9 MMOL/L (ref 0–18)
BASOPHILS # BLD AUTO: 0.04 X10(3) UL (ref 0–0.2)
BASOPHILS NFR BLD AUTO: 0.5 %
BUN BLD-MCNC: 13 MG/DL (ref 7–18)
BUN/CREAT SERPL: 15.7 (ref 10–20)
CALCIUM BLD-MCNC: 9 MG/DL (ref 8.5–10.1)
CHLORIDE SERPL-SCNC: 103 MMOL/L (ref 98–107)
CO2 SERPL-SCNC: 24 MMOL/L (ref 21–32)
CREAT BLD-MCNC: 0.83 MG/DL (ref 0.7–1.3)
DEPRECATED RDW RBC AUTO: 43.7 FL (ref 35.1–46.3)
EOSINOPHIL # BLD AUTO: 0.13 X10(3) UL (ref 0–0.7)
EOSINOPHIL NFR BLD AUTO: 1.7 %
ERYTHROCYTE [DISTWIDTH] IN BLOOD BY AUTOMATED COUNT: 13.5 % (ref 11–15)
GLUCOSE BLD-MCNC: 106 MG/DL (ref 70–99)
GLUCOSE BLDC GLUCOMTR-MCNC: 102 MG/DL (ref 70–99)
GLUCOSE BLDC GLUCOMTR-MCNC: 117 MG/DL (ref 70–99)
GLUCOSE BLDC GLUCOMTR-MCNC: 88 MG/DL (ref 70–99)
HAV IGM SER QL: 1.8 MG/DL (ref 1.6–2.6)
HCT VFR BLD AUTO: 41.6 % (ref 39–53)
HGB BLD-MCNC: 14.1 G/DL (ref 13–17.5)
IMM GRANULOCYTES # BLD AUTO: 0.02 X10(3) UL (ref 0–1)
IMM GRANULOCYTES NFR BLD: 0.3 %
LYMPHOCYTES # BLD AUTO: 2.27 X10(3) UL (ref 1–4)
LYMPHOCYTES NFR BLD AUTO: 29.6 %
MCH RBC QN AUTO: 30.1 PG (ref 26–34)
MCHC RBC AUTO-ENTMCNC: 33.9 G/DL (ref 31–37)
MCV RBC AUTO: 88.7 FL (ref 80–100)
MONOCYTES # BLD AUTO: 0.69 X10(3) UL (ref 0.1–1)
MONOCYTES NFR BLD AUTO: 9 %
NEUTROPHILS # BLD AUTO: 4.53 X10 (3) UL (ref 1.5–7.7)
NEUTROPHILS # BLD AUTO: 4.53 X10(3) UL (ref 1.5–7.7)
NEUTROPHILS NFR BLD AUTO: 58.9 %
OSMOLALITY SERPL CALC.SUM OF ELEC: 283 MOSM/KG (ref 275–295)
PLATELET # BLD AUTO: 218 10(3)UL (ref 150–450)
POTASSIUM SERPL-SCNC: 3.8 MMOL/L (ref 3.5–5.1)
RBC # BLD AUTO: 4.69 X10(6)UL (ref 3.8–5.8)
SODIUM SERPL-SCNC: 136 MMOL/L (ref 136–145)
WBC # BLD AUTO: 7.7 X10(3) UL (ref 4–11)

## 2019-03-19 PROCEDURE — 93306 TTE W/DOPPLER COMPLETE: CPT | Performed by: OTHER

## 2019-03-19 PROCEDURE — 99222 1ST HOSP IP/OBS MODERATE 55: CPT | Performed by: NURSE PRACTITIONER

## 2019-03-19 PROCEDURE — 99214 OFFICE O/P EST MOD 30 MIN: CPT | Performed by: OTHER

## 2019-03-19 RX ORDER — POTASSIUM CHLORIDE 20 MEQ/1
40 TABLET, EXTENDED RELEASE ORAL ONCE
Status: COMPLETED | OUTPATIENT
Start: 2019-03-19 | End: 2019-03-19

## 2019-03-19 RX ORDER — SILODOSIN 8 MG/1
8 CAPSULE ORAL DAILY
Qty: 30 CAPSULE | Refills: 3 | Status: SHIPPED | OUTPATIENT
Start: 2019-03-19 | End: 2019-04-18

## 2019-03-19 RX ORDER — MAGNESIUM OXIDE 400 MG (241.3 MG MAGNESIUM) TABLET
400 TABLET ONCE
Status: COMPLETED | OUTPATIENT
Start: 2019-03-19 | End: 2019-03-19

## 2019-03-19 NOTE — CONSULTS
[facility] AND CLINICS  [facility] Cardiology Consult Note    Brigida Porter Patient Status:  Observation    1955 MRN [id number]   Location [facility]5W Attending Ed MD Fredrick   Hosp Day # 0 PCP Kelly Gonzalez MD     70year old fem 64  Resp: 20  BP: 122/63    Intake/Output:     Intake/Output Summary (Last 24 hours) at 3/19/2019 1416  Last data filed at 3/19/2019 1000  Gross per 24 hour   Intake 855 ml   Output 1475 ml   Net -620 ml       Physical Exam:     General: Alert and oriented

## 2019-03-19 NOTE — CM/SW NOTE
SW received protocol order to verify social support & HH needs. Pt has been screened per chart review and during nursing rounds. Pt is from home w/ spouse, self care. PT/OT=home. Speech signed off. Pt has no identified needs at this time.   SW/CM will remai

## 2019-03-19 NOTE — PLAN OF CARE
Problem: Patient Centered Care  Goal: Patient preferences are identified and integrated in the patient's plan of care  Interventions:  - What would you like us to know as we care for you? nothing  - Provide timely, complete, and accurate information to pat suction as ordered  - Evaluate effectiveness of ordered antiemetic medications  - Provide nonpharmacologic comfort measures as appropriate  - Advance diet as tolerated, if ordered  - Obtain nutritional consult as needed  - Evaluate fluid balance  Outcome: including rhythm and repeat lab results as appropriate  - Fluid restriction as ordered  - Instruct patient on fluid and nutrition restrictions as appropriate  Outcome: Progressing      Problem: NEUROLOGICAL - ADULT  Goal: Achieves stable or improved neurol including physical limitations  - Instruct pt to call for assistance with activity based on assessment  - Modify environment to reduce risk of injury  - Provide assistive devices as appropriate  - Consider OT/PT consult to assist with strengthening/mobilit

## 2019-03-19 NOTE — PROGRESS NOTES
[facility] AND [facility]  Neurology Progress Note    Lupe Ford Patient Status:  Observation    1955 MRN [id number]   Location [facility]5W Attending Daja Hylton MD   Hosp Day # 0 PCP Kelly Gonzalez MD     Subjective:   Henrietta Khan 18 20   Temp:   97.9 °F (36.6 °C)    TempSrc:   Oral    SpO2: 97% 98% 98% 97%   Weight:  183 lb 9.6 oz     Height:           General: No apparent distress, well nourished, well groomed.   Head- Normocephalic, atraumatic  Eyes- No redness or swelling  Neck- without acute intracranial abnormality. Dictated by (CST): Fabrizio Moe MD on 3/18/2019 at 15:43     Approved by (CST): Fabrizio Moe MD on 3/18/2019 at 15:47          Mri Spine Cervical (cpt=72141)    Result Date: 3/18/2019  CONCLUSION:  1.  S-shaped sco disease in the cervical spine. There does not appear to be any evidence of myelopathy. Therefore his left leg and urinary problems are not related to his cervical cord.   That he possibly is describing takes, versus fasciculations, versus mild myoclonic j

## 2019-03-19 NOTE — CONSULTS
[facility] HOSP - [facility]    Report of Consultation    Yessica Lower Patient Status:  Observation    1955 MRN [id number]   Location [facility]5W Attending Verona Antoine MD   Hosp Day # 0 PCP Kelly Gonzalez MD     Date of Admis starting tamsulosin he has never felt right. He states he has had ongoing problems with nausea, intermittent diarrhea and constipation, insomnia, dizziness, headache, and breast tenderness.   He states he has not taken the medication since being admitted t MG Oral Tab Take 10 mg by mouth nightly. Sertraline HCl 100 MG Oral Tab Take 100 mg by mouth daily. Cholecalciferol 5000 units Oral Tab Take 1 tablet by mouth nightly. Review of Systems:     Constitutional: Negative for chills and fever.    Resp 03/18/2019     03/18/2019    B12 152 (L) 03/18/2019     Ct Brain Or Head (64108)    Result Date: 3/18/2019  CONCLUSION:  Mild chronic microvascular ischemic disease without acute intracranial abnormality.    Dictated by (CST): Alka Dickson MD on 3/18/ 10:12 by Clearance Knee. Impression:   #Weakness  #Hyponatremia  #Hyperglycemia  #Urinary retention  #Nausea    Patient is a 70year old male who presented to the hospital on 3/18/19.   He has had ongoing urination issues for which we are following him ou

## 2019-03-19 NOTE — BH PROGRESS NOTE
Behavioral Health Note  CHIEF COMPLAINT  Dizziness  REASON FOR ADMISSION  Dizziness    SOCIAL HISTORY  Rekha Tomlinson lives at home with his wife. He occasionally drinks beer. PAST PSYCHIATRIC HISTORY  Rekha Tomlinson has a h/o depression when 34years old.   He took z SI/HI  ASSESSMENT  The patient has a dx r/o depressive disorder, r/o anxiety disorder. PLAN  1. Referrals provided for outpatient follow-up at [facility]

## 2019-03-20 ENCOUNTER — HOSPITAL ENCOUNTER (EMERGENCY)
Facility: HOSPITAL | Age: 64
Discharge: HOME | End: 2019-03-20
Attending: EMERGENCY MEDICINE
Payer: COMMERCIAL

## 2019-03-20 ENCOUNTER — TELEPHONE (OUTPATIENT)
Dept: SURGERY | Facility: CLINIC | Age: 64
End: 2019-03-20

## 2019-03-20 VITALS
HEART RATE: 60 BPM | SYSTOLIC BLOOD PRESSURE: 128 MMHG | BODY MASS INDEX: 27.92 KG/M2 | RESPIRATION RATE: 18 BRPM | HEIGHT: 70 IN | OXYGEN SATURATION: 98 % | DIASTOLIC BLOOD PRESSURE: 70 MMHG | TEMPERATURE: 98 F | WEIGHT: 195 LBS

## 2019-03-20 DIAGNOSIS — R33.9 URINARY RETENTION: Primary | ICD-10-CM

## 2019-03-20 LAB
ANION GAP SERPL CALC-SCNC: 10 MMOL/L (ref 0–18)
BACTERIA UR QL AUTO: NEGATIVE /HPF
BASOPHILS # BLD AUTO: 0.04 X10(3) UL (ref 0–0.2)
BASOPHILS NFR BLD AUTO: 0.4 %
BILIRUB UR QL: NEGATIVE
BUN BLD-MCNC: 15 MG/DL (ref 7–18)
BUN/CREAT SERPL: 16.9 (ref 10–20)
CALCIUM BLD-MCNC: 9.2 MG/DL (ref 8.5–10.1)
CHLORIDE SERPL-SCNC: 98 MMOL/L (ref 98–107)
CLARITY UR: CLEAR
CO2 SERPL-SCNC: 24 MMOL/L (ref 21–32)
COLOR UR: YELLOW
CREAT BLD-MCNC: 0.89 MG/DL (ref 0.7–1.3)
DEPRECATED RDW RBC AUTO: 43.9 FL (ref 35.1–46.3)
EOSINOPHIL # BLD AUTO: 0.12 X10(3) UL (ref 0–0.7)
EOSINOPHIL NFR BLD AUTO: 1.3 %
ERYTHROCYTE [DISTWIDTH] IN BLOOD BY AUTOMATED COUNT: 13.5 % (ref 11–15)
GLUCOSE BLD-MCNC: 101 MG/DL (ref 70–99)
GLUCOSE UR-MCNC: NEGATIVE MG/DL
HCT VFR BLD AUTO: 44.1 % (ref 39–53)
HGB BLD-MCNC: 14.7 G/DL (ref 13–17.5)
IMM GRANULOCYTES # BLD AUTO: 0.03 X10(3) UL (ref 0–1)
IMM GRANULOCYTES NFR BLD: 0.3 %
KETONES UR-MCNC: NEGATIVE MG/DL
LEUKOCYTE ESTERASE UR QL STRIP.AUTO: NEGATIVE
LYMPHOCYTES # BLD AUTO: 2.55 X10(3) UL (ref 1–4)
LYMPHOCYTES NFR BLD AUTO: 27.6 %
MCH RBC QN AUTO: 29.8 PG (ref 26–34)
MCHC RBC AUTO-ENTMCNC: 33.3 G/DL (ref 31–37)
MCV RBC AUTO: 89.5 FL (ref 80–100)
MONOCYTES # BLD AUTO: 0.7 X10(3) UL (ref 0.1–1)
MONOCYTES NFR BLD AUTO: 7.6 %
NEUTROPHILS # BLD AUTO: 5.79 X10 (3) UL (ref 1.5–7.7)
NEUTROPHILS # BLD AUTO: 5.79 X10(3) UL (ref 1.5–7.7)
NEUTROPHILS NFR BLD AUTO: 62.8 %
NITRITE UR QL STRIP.AUTO: NEGATIVE
OSMOLALITY SERPL CALC.SUM OF ELEC: 275 MOSM/KG (ref 275–295)
PH UR: 5 [PH] (ref 5–8)
PLATELET # BLD AUTO: 235 10(3)UL (ref 150–450)
POTASSIUM SERPL-SCNC: 3.9 MMOL/L (ref 3.5–5.1)
PROT UR-MCNC: NEGATIVE MG/DL
RBC # BLD AUTO: 4.93 X10(6)UL (ref 3.8–5.8)
RBC #/AREA URNS AUTO: 1 /HPF
SODIUM SERPL-SCNC: 132 MMOL/L (ref 136–145)
SP GR UR STRIP: 1 (ref 1–1.03)
UROBILINOGEN UR STRIP-ACNC: <2
VIT C UR-MCNC: NEGATIVE MG/DL
WBC # BLD AUTO: 9.2 X10(3) UL (ref 4–11)
WBC #/AREA URNS AUTO: <1 /HPF

## 2019-03-20 PROCEDURE — 99283 EMERGENCY DEPT VISIT LOW MDM: CPT

## 2019-03-20 PROCEDURE — 51702 INSERT TEMP BLADDER CATH: CPT

## 2019-03-20 PROCEDURE — 80048 BASIC METABOLIC PNL TOTAL CA: CPT | Performed by: EMERGENCY MEDICINE

## 2019-03-20 PROCEDURE — 85025 COMPLETE CBC W/AUTO DIFF WBC: CPT | Performed by: EMERGENCY MEDICINE

## 2019-03-20 PROCEDURE — 81001 URINALYSIS AUTO W/SCOPE: CPT

## 2019-03-20 PROCEDURE — 81001 URINALYSIS AUTO W/SCOPE: CPT | Performed by: EMERGENCY MEDICINE

## 2019-03-20 PROCEDURE — 96365 THER/PROPH/DIAG IV INF INIT: CPT

## 2019-03-20 NOTE — TELEPHONE ENCOUNTER
Patient has not taken any alpha blockers since 3/17 which could be why he has had some difficulty with urination.   If he is feeling uncomfortable with increasing bladder pressure then I can see him in the office for a bladder ultrasound anytime before 3 pm

## 2019-03-20 NOTE — TELEPHONE ENCOUNTER
Spoke with pt and determined that his barrios cath was removed in the evening at the hospital on 3/18. Pt states that yesterday his urination was pretty normal and he had a good BM also.  Started Rapaflo this morning and states that he urinated a small amt of

## 2019-03-20 NOTE — DISCHARGE SUMMARY
[facility] Hospitalist Discharge Summary   Patient ID:  Irvin Steward [id number]  70year old 6/24/1955    Admit date: 3/18/2019  Discharge date: 3/19/2019  Risk of Readmission Lace+ Score: 45  59-90 High Risk  29-58 Medium Risk  0-28   Low Risk    Primary Care Patients symptoms resolved the morning after arrival and patient was discharged home with urology, PCP, and cardiology follow up as OP.  Rest of the hospital course as below.      Left arm and left leg weakness  - neuro exam intact, no focal weakness,  - CT at 8:34     Approved by (CST): Mary Mares MD on 3/18/2019 at 8:39          Mri Brain (cpt=70551)    Result Date: 3/18/2019  CONCLUSION:   Motion degraded exam.  Mild chronic microvascular ischemic disease without acute intracranial abnormality.     Dictat Tartrate 10 MG Tabs  Commonly known as:  AMBIEN        STOP taking these medications    tamsulosin HCl 0.4 MG Caps  Commonly known as:  FLOMAX           Where to Get Your Medications      These medications were sent to [facility] [address] [place], [place] [address]

## 2019-03-20 NOTE — TELEPHONE ENCOUNTER
I spoke with pt and informed her of [facility]'s msg below and pt stated that he was taking Tamsulosin up until this past Monday. I asked if he feels bladder pain or pressure and he denid and stated \"but my stream is weak\".  I again explained to pt that we know

## 2019-03-20 NOTE — TELEPHONE ENCOUNTER
Pt called stating pt was released from [facility] hosp 3-19-19 and saw Rufino crafter. Cath was removed. 3-20-19 pt is having trouble urinating.   Please call to advise

## 2019-03-21 NOTE — ED NOTES
Maria inserted for acute urinary retention x 1 attempt. Sterile technique used. Pt tolerated well. Clear pale yellow urine noted.

## 2019-03-21 NOTE — ED PROVIDER NOTES
Patient Seen in: [facility] AND [facility] Emergency Department    History   Patient presents with:  Urinary Symptoms (urologic)    Stated Complaint: urinary retention    HPI    Resents the emergency department complaining of urinary retention.   He states that h Neck: Normal range of motion. Neck supple. Cardiovascular: Normal rate, regular rhythm and intact distal pulses. No murmur heard. Pulmonary/Chest: Effort normal and breath sounds normal. No respiratory distress. Abdominal: Soft.  He exhibits no dis [address] MD Karin  [address]  [place]  [phone]    Schedule an appointment as soon as possible for a visit      We recommend that you schedule follow up care with a primary care provider wit

## 2019-03-21 NOTE — ED INITIAL ASSESSMENT (HPI)
Pt to ER with c/o urinary retention. Pt with recent discharge from [facility] with same complaint. Pt c/o increased pelvic pain and pressure.

## 2019-03-22 ENCOUNTER — TELEPHONE (OUTPATIENT)
Dept: SURGERY | Facility: CLINIC | Age: 64
End: 2019-03-22

## 2019-03-22 NOTE — TELEPHONE ENCOUNTER
Phoned pt and spoke with him. Offered ER fov with Luba HENDRICKS, 3/27/19. Pt accepted and appt arranged in [facility].

## 2019-03-22 NOTE — TELEPHONE ENCOUNTER
Pt called stating pt went to [facility] er on 3-20-19 pt was unable to urinate and weak. Placed a cath. Still has it in and is attached to pt's leg. Pt does have appt on 4-4-19 for a test in the office. Can pt be seen sooner?   Call pt to advise

## 2019-03-27 ENCOUNTER — OFFICE VISIT (OUTPATIENT)
Dept: SURGERY | Facility: CLINIC | Age: 64
End: 2019-03-27
Payer: MEDICARE

## 2019-03-27 VITALS
WEIGHT: 195 LBS | TEMPERATURE: 98 F | DIASTOLIC BLOOD PRESSURE: 68 MMHG | BODY MASS INDEX: 28 KG/M2 | SYSTOLIC BLOOD PRESSURE: 113 MMHG | HEART RATE: 70 BPM

## 2019-03-27 DIAGNOSIS — N40.1 BENIGN PROSTATIC HYPERPLASIA WITH URINARY FREQUENCY: ICD-10-CM

## 2019-03-27 DIAGNOSIS — Z97.8 FOLEY CATHETER IN PLACE: Primary | ICD-10-CM

## 2019-03-27 DIAGNOSIS — R33.9 URINARY RETENTION: ICD-10-CM

## 2019-03-27 DIAGNOSIS — R35.0 BENIGN PROSTATIC HYPERPLASIA WITH URINARY FREQUENCY: ICD-10-CM

## 2019-03-27 PROCEDURE — 99212 OFFICE O/P EST SF 10 MIN: CPT | Performed by: NURSE PRACTITIONER

## 2019-03-27 PROCEDURE — G0463 HOSPITAL OUTPT CLINIC VISIT: HCPCS | Performed by: NURSE PRACTITIONER

## 2019-03-27 NOTE — PROGRESS NOTES
HPI:    Patient ID: Roselyn Shelley is a 70year old male. HPI  Patient is a 70year old male who presents to the clinic for a follow up. Patient was recently hospitalized 3/18/19 with left upper and lower extremity numbness and weakness.   During h units Oral Tab Take 1 tablet by mouth nightly.    Disp:  Rfl:      Allergies:No Known Allergies    HISTORY:  Past Medical History:   Diagnosis Date   • Diabetes ([facility] [address]    • Enlarged prostate    • Essential hypertension       Past Surgical History:   Procedur [id number]

## 2019-04-04 ENCOUNTER — OFFICE VISIT (OUTPATIENT)
Dept: SURGERY | Facility: CLINIC | Age: 64
End: 2019-04-04
Payer: COMMERCIAL

## 2019-04-04 DIAGNOSIS — R33.9 URINARY RETENTION: Primary | ICD-10-CM

## 2019-04-04 PROCEDURE — 51741 ELECTRO-UROFLOWMETRY FIRST: CPT | Performed by: UROLOGY

## 2019-04-04 PROCEDURE — 51784 ANAL/URINARY MUSCLE STUDY: CPT | Performed by: UROLOGY

## 2019-04-04 PROCEDURE — 51728 CYSTOMETROGRAM W/VP: CPT | Performed by: UROLOGY

## 2019-04-04 PROCEDURE — 99213 OFFICE O/P EST LOW 20 MIN: CPT | Performed by: UROLOGY

## 2019-04-04 RX ORDER — CIPROFLOXACIN 500 MG/1
500 TABLET, FILM COATED ORAL ONCE
Status: COMPLETED | OUTPATIENT
Start: 2019-04-04 | End: 2019-04-04

## 2019-04-04 RX ADMIN — CIPROFLOXACIN 500 MG: 500 TABLET, FILM COATED ORAL at 12:27:00

## 2019-04-04 NOTE — PROGRESS NOTES
I called the pt into the exam room and introduced myself and verified his name and  and I then explained that I will perform a CMG test and I in detail reviewed with the test entailed and also that I would give him 1 Cipro 500 mg abx tab after the test urine amount is large such as 500-700 ml then he should call the office to inform as he may need to catheterize more often than nightly.  I also advised that he make sure to avoid constipation since this can cause pressure on the prostate there by narrowing

## 2019-04-08 ENCOUNTER — TELEPHONE (OUTPATIENT)
Dept: SURGERY | Facility: CLINIC | Age: 64
End: 2019-04-08

## 2019-04-08 DIAGNOSIS — R35.0 URINARY FREQUENCY: Primary | ICD-10-CM

## 2019-04-08 NOTE — PROGRESS NOTES
Annita Rich is a 70year old male. HPI:   No chief complaint on file. 64-year-old male accompanied by his wife in follow-up to a previous visit March 11, 2019.   The patient has had BPH and had a 5-6-month history of progressive lower urinary trac Disp:  Rfl:    Sertraline HCl 100 MG Oral Tab Take 100 mg by mouth daily. Disp:  Rfl:    Cholecalciferol 5000 units Oral Tab Take 1 tablet by mouth nightly.    Disp:  Rfl:        Allergies:  No Known Allergies      ROS:           What follows Is a brief sum

## 2019-04-08 NOTE — TELEPHONE ENCOUNTER
Spoke with pt and determined that he performed CIC 3 nights in a row and the first night he got 75 ml, second night-3 ml, third night 25 ml.  I asked pt if he felt that he was emptying his bladder and he stated that he still has a mild feeling of bladder pr

## 2019-04-10 NOTE — TELEPHONE ENCOUNTER
Pt's wife stopped by to  Let us know that her  is not feeling well, uncomfortable in the lower  abdominal area /groin. She is concerned that  Pt is not understanding  Maria M's advise.  She is concerned that we are focusing on the wrong  Issue,  All he i

## 2019-04-10 NOTE — TELEPHONE ENCOUNTER
Pt. Is calling to f/up with RN regarding his bladder issues and find out if there are any instructions from [facility] [place]?

## 2019-04-10 NOTE — TELEPHONE ENCOUNTER
I called pt back and apologized that I sent the message to [facility] about his symptoms but I have not gotten a response yet. I asked pt how the urination was going and he stated that its the same and that he is absolutely miserable and needs some help.  I asked

## 2019-04-11 NOTE — TELEPHONE ENCOUNTER
I called patient and advised him to submit a mid stream clean catch urine specimen for urinalysis and urine culture at lab today or tomorrow, then see [facility] [place] Monday @ 0800, instead of waiting until 4/23 for his f/u. Patient agrees to this plan.   Orders gener

## 2019-04-11 NOTE — TELEPHONE ENCOUNTER
Spoke to patient. Patient c/o urinary frequency, urgency, slight dysuria, weak urine stream, urine stream will spray, chills during the night, onset 2-3 days ago.  Patient states he did not self catheterize last night b/c he is having difficulty with advanc

## 2019-04-12 ENCOUNTER — APPOINTMENT (OUTPATIENT)
Dept: LAB | Facility: HOSPITAL | Age: 64
End: 2019-04-12
Attending: UROLOGY
Payer: MEDICARE

## 2019-04-12 DIAGNOSIS — R35.0 URINARY FREQUENCY: ICD-10-CM

## 2019-04-12 PROCEDURE — 81003 URINALYSIS AUTO W/O SCOPE: CPT

## 2019-04-12 PROCEDURE — 87086 URINE CULTURE/COLONY COUNT: CPT

## 2019-04-15 ENCOUNTER — LAB ENCOUNTER (OUTPATIENT)
Dept: LAB | Facility: HOSPITAL | Age: 64
End: 2019-04-15
Attending: UROLOGY
Payer: COMMERCIAL

## 2019-04-15 ENCOUNTER — TELEPHONE (OUTPATIENT)
Dept: SURGERY | Facility: CLINIC | Age: 64
End: 2019-04-15

## 2019-04-15 ENCOUNTER — OFFICE VISIT (OUTPATIENT)
Dept: SURGERY | Facility: CLINIC | Age: 64
End: 2019-04-15
Payer: MEDICARE

## 2019-04-15 VITALS
BODY MASS INDEX: 28 KG/M2 | WEIGHT: 195 LBS | HEART RATE: 60 BPM | DIASTOLIC BLOOD PRESSURE: 74 MMHG | SYSTOLIC BLOOD PRESSURE: 129 MMHG

## 2019-04-15 DIAGNOSIS — R35.0 URINARY FREQUENCY: Primary | ICD-10-CM

## 2019-04-15 DIAGNOSIS — Z01.818 PREOP EXAMINATION: ICD-10-CM

## 2019-04-15 DIAGNOSIS — N40.1 BENIGN PROSTATIC HYPERPLASIA WITH URINARY FREQUENCY: ICD-10-CM

## 2019-04-15 DIAGNOSIS — R35.0 BENIGN PROSTATIC HYPERPLASIA WITH URINARY FREQUENCY: ICD-10-CM

## 2019-04-15 PROCEDURE — 36415 COLL VENOUS BLD VENIPUNCTURE: CPT

## 2019-04-15 PROCEDURE — 85025 COMPLETE CBC W/AUTO DIFF WBC: CPT

## 2019-04-15 PROCEDURE — G0463 HOSPITAL OUTPT CLINIC VISIT: HCPCS | Performed by: UROLOGY

## 2019-04-15 PROCEDURE — 99213 OFFICE O/P EST LOW 20 MIN: CPT | Performed by: UROLOGY

## 2019-04-15 PROCEDURE — 80048 BASIC METABOLIC PNL TOTAL CA: CPT

## 2019-04-15 NOTE — TELEPHONE ENCOUNTER
Patient seen in office today scheduled for cystoscopy, greenlight laser vaporization of the prostate.,  Went over preop instructions with patient, will have labs done today, all questions answered patient verbalized understanding nothing further is needed.

## 2019-04-15 NOTE — PROGRESS NOTES
Jerry Torres is a 70year old male. HPI:   Patient presents with:   Follow - Up: patient presents for f/u on urinary symptoms stopped cic about 1 week ago due to small amounts of urine that were coming out       70year old male with wife in followup HCL OR Take 1,000 mg by mouth 2 (two) times daily with meals. Disp:  Rfl:    FINASTERIDE OR Take 5 mg by mouth daily. Disp:  Rfl:    Zolpidem Tartrate 10 MG Oral Tab Take 10 mg by mouth nightly.  Disp:  Rfl:    atorvastatin 10 MG Oral Tab Take 10 mg by

## 2019-04-24 ENCOUNTER — ANESTHESIA (OUTPATIENT)
Dept: SURGERY | Facility: HOSPITAL | Age: 64
End: 2019-04-24
Payer: COMMERCIAL

## 2019-04-24 ENCOUNTER — ANESTHESIA EVENT (OUTPATIENT)
Dept: SURGERY | Facility: HOSPITAL | Age: 64
End: 2019-04-24
Payer: COMMERCIAL

## 2019-04-24 ENCOUNTER — HOSPITAL ENCOUNTER (OUTPATIENT)
Facility: HOSPITAL | Age: 64
Setting detail: HOSPITAL OUTPATIENT SURGERY
Discharge: HOME | End: 2019-04-24
Attending: UROLOGY | Admitting: UROLOGY
Payer: COMMERCIAL

## 2019-04-24 VITALS
OXYGEN SATURATION: 97 % | WEIGHT: 192 LBS | TEMPERATURE: 98 F | HEIGHT: 70 IN | RESPIRATION RATE: 15 BRPM | BODY MASS INDEX: 27.49 KG/M2 | SYSTOLIC BLOOD PRESSURE: 143 MMHG | DIASTOLIC BLOOD PRESSURE: 67 MMHG | HEART RATE: 58 BPM

## 2019-04-24 DIAGNOSIS — R35.0 BENIGN PROSTATIC HYPERPLASIA WITH URINARY FREQUENCY: ICD-10-CM

## 2019-04-24 DIAGNOSIS — N40.1 BENIGN PROSTATIC HYPERPLASIA WITH URINARY FREQUENCY: ICD-10-CM

## 2019-04-24 LAB
GLUCOSE BLDC GLUCOMTR-MCNC: 138 MG/DL (ref 70–99)
GLUCOSE BLDC GLUCOMTR-MCNC: 154 MG/DL (ref 70–99)

## 2019-04-24 PROCEDURE — 52648 LASER SURGERY OF PROSTATE: CPT | Performed by: UROLOGY

## 2019-04-24 PROCEDURE — 0V508ZZ DESTRUCTION OF PROSTATE, VIA NATURAL OR ARTIFICIAL OPENING ENDOSCOPIC: ICD-10-PCS | Performed by: UROLOGY

## 2019-04-24 RX ORDER — PHENAZOPYRIDINE HYDROCHLORIDE 200 MG/1
200 TABLET, FILM COATED ORAL 3 TIMES DAILY PRN
Qty: 10 TABLET | Refills: 0 | Status: SHIPPED | OUTPATIENT
Start: 2019-04-24 | End: 2019-06-01 | Stop reason: ALTCHOICE

## 2019-04-24 RX ORDER — ROCURONIUM BROMIDE 10 MG/ML
INJECTION, SOLUTION INTRAVENOUS AS NEEDED
Status: DISCONTINUED | OUTPATIENT
Start: 2019-04-24 | End: 2019-04-24 | Stop reason: SURG

## 2019-04-24 RX ORDER — SODIUM CHLORIDE 9 MG/ML
INJECTION, SOLUTION INTRAVENOUS CONTINUOUS
Status: DISCONTINUED | OUTPATIENT
Start: 2019-04-24 | End: 2019-04-24

## 2019-04-24 RX ORDER — SODIUM CHLORIDE, SODIUM LACTATE, POTASSIUM CHLORIDE, CALCIUM CHLORIDE 600; 310; 30; 20 MG/100ML; MG/100ML; MG/100ML; MG/100ML
INJECTION, SOLUTION INTRAVENOUS CONTINUOUS PRN
Status: DISCONTINUED | OUTPATIENT
Start: 2019-04-24 | End: 2019-04-24 | Stop reason: SURG

## 2019-04-24 RX ORDER — SODIUM CHLORIDE, SODIUM LACTATE, POTASSIUM CHLORIDE, CALCIUM CHLORIDE 600; 310; 30; 20 MG/100ML; MG/100ML; MG/100ML; MG/100ML
INJECTION, SOLUTION INTRAVENOUS CONTINUOUS
Status: DISCONTINUED | OUTPATIENT
Start: 2019-04-24 | End: 2019-04-24

## 2019-04-24 RX ORDER — LIDOCAINE HYDROCHLORIDE 10 MG/ML
INJECTION, SOLUTION EPIDURAL; INFILTRATION; INTRACAUDAL; PERINEURAL AS NEEDED
Status: DISCONTINUED | OUTPATIENT
Start: 2019-04-24 | End: 2019-04-24 | Stop reason: SURG

## 2019-04-24 RX ORDER — MORPHINE SULFATE 10 MG/ML
6 INJECTION, SOLUTION INTRAMUSCULAR; INTRAVENOUS EVERY 10 MIN PRN
Status: DISCONTINUED | OUTPATIENT
Start: 2019-04-24 | End: 2019-04-24

## 2019-04-24 RX ORDER — PHENAZOPYRIDINE HYDROCHLORIDE 200 MG/1
200 TABLET, FILM COATED ORAL ONCE
Status: COMPLETED | OUTPATIENT
Start: 2019-04-24 | End: 2019-04-24

## 2019-04-24 RX ORDER — HYDROCODONE BITARTRATE AND ACETAMINOPHEN 5; 325 MG/1; MG/1
2 TABLET ORAL AS NEEDED
Status: DISCONTINUED | OUTPATIENT
Start: 2019-04-24 | End: 2019-04-24

## 2019-04-24 RX ORDER — SULFAMETHOXAZOLE AND TRIMETHOPRIM 800; 160 MG/1; MG/1
1 TABLET ORAL 2 TIMES DAILY
Qty: 6 TABLET | Refills: 0 | Status: SHIPPED | OUTPATIENT
Start: 2019-04-25 | End: 2019-04-28

## 2019-04-24 RX ORDER — ONDANSETRON 2 MG/ML
4 INJECTION INTRAMUSCULAR; INTRAVENOUS ONCE AS NEEDED
Status: DISCONTINUED | OUTPATIENT
Start: 2019-04-24 | End: 2019-04-24

## 2019-04-24 RX ORDER — DEXTROSE MONOHYDRATE 25 G/50ML
50 INJECTION, SOLUTION INTRAVENOUS
Status: DISCONTINUED | OUTPATIENT
Start: 2019-04-24 | End: 2019-04-24

## 2019-04-24 RX ORDER — HYDROCODONE BITARTRATE AND ACETAMINOPHEN 5; 325 MG/1; MG/1
1 TABLET ORAL AS NEEDED
Status: DISCONTINUED | OUTPATIENT
Start: 2019-04-24 | End: 2019-04-24

## 2019-04-24 RX ORDER — GLYCOPYRROLATE 0.2 MG/ML
INJECTION INTRAMUSCULAR; INTRAVENOUS AS NEEDED
Status: DISCONTINUED | OUTPATIENT
Start: 2019-04-24 | End: 2019-04-24 | Stop reason: SURG

## 2019-04-24 RX ORDER — ONDANSETRON 2 MG/ML
INJECTION INTRAMUSCULAR; INTRAVENOUS AS NEEDED
Status: DISCONTINUED | OUTPATIENT
Start: 2019-04-24 | End: 2019-04-24 | Stop reason: SURG

## 2019-04-24 RX ORDER — MORPHINE SULFATE 4 MG/ML
2 INJECTION, SOLUTION INTRAMUSCULAR; INTRAVENOUS EVERY 10 MIN PRN
Status: DISCONTINUED | OUTPATIENT
Start: 2019-04-24 | End: 2019-04-24

## 2019-04-24 RX ORDER — FAMOTIDINE 20 MG/1
20 TABLET ORAL ONCE
Status: DISCONTINUED | OUTPATIENT
Start: 2019-04-24 | End: 2019-04-24 | Stop reason: HOSPADM

## 2019-04-24 RX ORDER — ACETAMINOPHEN 500 MG
1000 TABLET ORAL ONCE
Status: COMPLETED | OUTPATIENT
Start: 2019-04-24 | End: 2019-04-24

## 2019-04-24 RX ORDER — LIDOCAINE HYDROCHLORIDE 40 MG/ML
SOLUTION TOPICAL AS NEEDED
Status: DISCONTINUED | OUTPATIENT
Start: 2019-04-24 | End: 2019-04-24 | Stop reason: SURG

## 2019-04-24 RX ORDER — MORPHINE SULFATE 4 MG/ML
4 INJECTION, SOLUTION INTRAMUSCULAR; INTRAVENOUS EVERY 10 MIN PRN
Status: DISCONTINUED | OUTPATIENT
Start: 2019-04-24 | End: 2019-04-24

## 2019-04-24 RX ORDER — NALOXONE HYDROCHLORIDE 0.4 MG/ML
80 INJECTION, SOLUTION INTRAMUSCULAR; INTRAVENOUS; SUBCUTANEOUS AS NEEDED
Status: DISCONTINUED | OUTPATIENT
Start: 2019-04-24 | End: 2019-04-24

## 2019-04-24 RX ORDER — SILODOSIN 4 MG/1
CAPSULE ORAL
COMMUNITY

## 2019-04-24 RX ORDER — DEXAMETHASONE SODIUM PHOSPHATE 4 MG/ML
VIAL (ML) INJECTION AS NEEDED
Status: DISCONTINUED | OUTPATIENT
Start: 2019-04-24 | End: 2019-04-24

## 2019-04-24 RX ORDER — MIDAZOLAM HYDROCHLORIDE 1 MG/ML
INJECTION INTRAMUSCULAR; INTRAVENOUS AS NEEDED
Status: DISCONTINUED | OUTPATIENT
Start: 2019-04-24 | End: 2019-04-24 | Stop reason: SURG

## 2019-04-24 RX ORDER — HALOPERIDOL 5 MG/ML
0.25 INJECTION INTRAMUSCULAR ONCE AS NEEDED
Status: DISCONTINUED | OUTPATIENT
Start: 2019-04-24 | End: 2019-04-24

## 2019-04-24 RX ADMIN — LIDOCAINE HYDROCHLORIDE 4 ML: 40 SOLUTION TOPICAL at 09:33:00

## 2019-04-24 RX ADMIN — GLYCOPYRROLATE 0.2 MG: 0.2 INJECTION INTRAMUSCULAR; INTRAVENOUS at 09:33:00

## 2019-04-24 RX ADMIN — SODIUM CHLORIDE, SODIUM LACTATE, POTASSIUM CHLORIDE, CALCIUM CHLORIDE: 600; 310; 30; 20 INJECTION, SOLUTION INTRAVENOUS at 09:33:00

## 2019-04-24 RX ADMIN — ONDANSETRON 4 MG: 2 INJECTION INTRAMUSCULAR; INTRAVENOUS at 09:33:00

## 2019-04-24 RX ADMIN — ROCURONIUM BROMIDE 10 MG: 10 INJECTION, SOLUTION INTRAVENOUS at 09:33:00

## 2019-04-24 RX ADMIN — MIDAZOLAM HYDROCHLORIDE 2 MG: 1 INJECTION INTRAMUSCULAR; INTRAVENOUS at 09:33:00

## 2019-04-24 RX ADMIN — LIDOCAINE HYDROCHLORIDE 50 MG: 10 INJECTION, SOLUTION EPIDURAL; INFILTRATION; INTRACAUDAL; PERINEURAL at 09:33:00

## 2019-04-24 RX ADMIN — SODIUM CHLORIDE, SODIUM LACTATE, POTASSIUM CHLORIDE, CALCIUM CHLORIDE: 600; 310; 30; 20 INJECTION, SOLUTION INTRAVENOUS at 10:34:00

## 2019-04-24 NOTE — ANESTHESIA POSTPROCEDURE EVALUATION
Patient: Julián Honeycutt    Procedure Summary     Date:  04/24/19 Room / Location:  [address] [place] [address] MAIN OR 14 / [address] [place] [address] MAIN OR    Anesthesia Start:  5213 Anesthesia Stop:  1034    Procedures:       CYSTOSCOPY (N/A Urethra)      GREEN LIGHT LASER OF THE PROSTATE (N/A Ure

## 2019-04-24 NOTE — H&P
Patient presents with: Follow - Up: patient presents for f/u on urinary symptoms stopped cic about 1 week ago due to small amounts of urine that were coming out         70year old male with wife in followup to a visit 4/4/2019.   He has had intractable HOMAR 1,000 mg by mouth 2 (two) times daily with meals. Disp:  Rfl:    FINASTERIDE OR Take 5 mg by mouth daily. Disp:  Rfl:    Zolpidem Tartrate 10 MG Oral Tab Take 10 mg by mouth nightly.  Disp:  Rfl:    atorvastatin 10 MG Oral Tab Take 10 mg by mouth nightl

## 2019-04-24 NOTE — OPERATIVE REPORT
[facility] - [facility]    Operative Note     Micaela Carnes Location: OR   CSN [id number] MRN [id number]   Admission Date 4/24/2019 Operation Date 4/24/2019   Attending Physician Patria Schmidt MD Operating Physician Jemima Ibarra MD      Preoperativ then duplicated on the left side vaporizing the lateral lobe from the bladder neck back to the level of the verumontanum. The median lobe was finally vaporized to flatten out the prostatic floor.   At the completion of the procedure with the tip of the  cy

## 2019-04-24 NOTE — ANESTHESIA PREPROCEDURE EVALUATION
Anesthesia PreOp Note    HPI:     Gaurav Mcpherson is a 70year old male who presents for preoperative consultation requested by:  Hall Runner, MD    Date of Surgery: 4/24/2019    Procedure(s):  CYSTOSCOPY  GREEN LIGHT LASER OF THE PROSTATE  Indication: B Rfl:  4/24/2019 at Unknown time   Zolpidem Tartrate 10 MG Oral Tab Take 10 mg by mouth nightly. Disp:  Rfl:  3/17/2019 at 2100   Sertraline HCl 100 MG Oral Tab Take 100 mg by mouth daily.  Disp:  Rfl:  4/24/2019 at Unknown time   Cholecalciferol 5000 units phone: Not on file        Gets together: Not on file        Attends [employer] witness service: Not on file        Active member of club or organization: Not on file        Attends meetings of clubs or organizations: Not on file        Relationship status: Not on f controlled,   Abdominal  - normal exam             Anesthesia Plan:   ASA:  3  Plan:   General  Airway:  ETT  Post-op Pain Management: IV analgesics  Informed Consent Plan and Risks Discussed With:  Patient  Discussed plan with:  Surgeon      I have inform

## 2019-04-24 NOTE — ANESTHESIA PROCEDURE NOTES
Airway  Date/Time: 4/24/2019 9:35 AM  Urgency: elective    Airway not difficult    General Information and Staff    Patient location during procedure: OR  Anesthesiologist: Leia Delgado MD  Performed: anesthesiologist     Indications and Patient Condition

## 2019-04-25 ENCOUNTER — TELEPHONE (OUTPATIENT)
Dept: SURGERY | Facility: CLINIC | Age: 64
End: 2019-04-25

## 2019-05-02 NOTE — TELEPHONE ENCOUNTER
Spoke with patient, helped schedule patient for appt 5/17. PT verbalized understanding.      Future Appointments   Date Time Provider Marielle Schafer   5/17/2019  1:10 PM Liana Dahl MD [facility]

## 2019-05-17 ENCOUNTER — OFFICE VISIT (OUTPATIENT)
Dept: SURGERY | Facility: CLINIC | Age: 64
End: 2019-05-17
Payer: COMMERCIAL

## 2019-05-17 VITALS
WEIGHT: 200 LBS | SYSTOLIC BLOOD PRESSURE: 119 MMHG | TEMPERATURE: 99 F | BODY MASS INDEX: 29 KG/M2 | DIASTOLIC BLOOD PRESSURE: 76 MMHG | HEART RATE: 57 BPM

## 2019-05-17 DIAGNOSIS — R35.0 BENIGN PROSTATIC HYPERPLASIA WITH URINARY FREQUENCY: Primary | ICD-10-CM

## 2019-05-17 DIAGNOSIS — R33.9 URINARY RETENTION: ICD-10-CM

## 2019-05-17 DIAGNOSIS — N40.1 BENIGN PROSTATIC HYPERPLASIA WITH URINARY FREQUENCY: Primary | ICD-10-CM

## 2019-05-17 PROCEDURE — 99212 OFFICE O/P EST SF 10 MIN: CPT | Performed by: UROLOGY

## 2019-05-17 PROCEDURE — 99024 POSTOP FOLLOW-UP VISIT: CPT | Performed by: UROLOGY

## 2019-05-17 NOTE — PROGRESS NOTES
Blaire Hollingsworth is a 70year old male. HPI:   Patient presents with: Follow - Up: PT presents for follow up after surgery. States urinary symptoms have improved. PT states he still has some blood in his urine.        66-year-old male status post cystosc MG Oral Cap Take by mouth. Disp:  Rfl:    ENALAPRIL MALEATE OR Take 20 mg by mouth 2 (two) times daily. Disp:  Rfl:    VERAPAMIL HCL ER OR Take 240 mg by mouth daily.    Disp:  Rfl:    METFORMIN HCL OR Take 1,000 mg by mouth 2 (two) times daily with meals

## 2019-06-01 ENCOUNTER — OFFICE VISIT (OUTPATIENT)
Dept: FAMILY MEDICINE CLINIC | Facility: CLINIC | Age: 64
End: 2019-06-01
Payer: COMMERCIAL

## 2019-06-01 VITALS
RESPIRATION RATE: 16 BRPM | BODY MASS INDEX: 29.62 KG/M2 | DIASTOLIC BLOOD PRESSURE: 58 MMHG | TEMPERATURE: 98 F | OXYGEN SATURATION: 96 % | HEIGHT: 69 IN | SYSTOLIC BLOOD PRESSURE: 120 MMHG | WEIGHT: 200 LBS | HEART RATE: 72 BPM

## 2019-06-01 DIAGNOSIS — J02.9 SORE THROAT: Primary | ICD-10-CM

## 2019-06-01 DIAGNOSIS — Z87.891 FORMER SMOKER: ICD-10-CM

## 2019-06-01 DIAGNOSIS — J06.9 VIRAL URI WITH COUGH: ICD-10-CM

## 2019-06-01 PROCEDURE — 99202 OFFICE O/P NEW SF 15 MIN: CPT | Performed by: NURSE PRACTITIONER

## 2019-06-01 PROCEDURE — 87880 STREP A ASSAY W/OPTIC: CPT | Performed by: NURSE PRACTITIONER

## 2019-06-01 PROCEDURE — 87081 CULTURE SCREEN ONLY: CPT | Performed by: NURSE PRACTITIONER

## 2019-06-01 RX ORDER — BENZONATATE 200 MG/1
200 CAPSULE ORAL 3 TIMES DAILY PRN
Qty: 30 CAPSULE | Refills: 0 | Status: SHIPPED | OUTPATIENT
Start: 2019-06-01

## 2019-06-01 NOTE — PROGRESS NOTES
CHIEF COMPLAINT:   Patient presents with:  Sore Throat        HPI:   Lupe Ford is a 70year old male presents to clinic with complaint of sore throat. Patient has had for 2-3 days. Symptoms have constant since onset.   Patient reports following asso Years since quittin.4      Smokeless tobacco: Never Used    Alcohol use: No      Frequency: Never    Drug use: Not Currently      Comment:  Last summer used medical marijuana       REVIEW OF SYSTEMS:   GENERAL HEALTH: feels well otherwise, richard Sore throat  (primary encounter diagnosis)  Former smoker  Viral uri with cough    PLAN: Comfort care as listed in patient instructions. Medication as below.     Requested Prescriptions     Signed Prescriptions Disp Refills   • benzonatate 200 MG Oral Cap · Irritants such as tobacco smoke or air pollution  · Acid reflux  A healthy throat  The tonsils are on the sides of the throat near the base of the tongue. They collect viruses and bacteria and help fight infection.  The throat (pharynx) is the passage for Treating a sore throat  Treatment depends on many factors. What is the likely cause? Is the problem recent? Does it keep coming back? In many cases, the best thing to do is to treat the symptoms, rest, and let the problem heal itself.  Antibiotics may help In some cases, tonsils need to be removed. This is often done as outpatient (same-day) surgery. Your healthcare provider may advise removing the tonsils in cases of:  · Several severe bouts of tonsillitis in a year.  “Severe” episodes include those that alanna Gargle to ease irritation  Gargling every hour or 2 can ease irritation.  Try gargling with 1 of these solutions:  · 1/4 teaspoon of salt in 1/2 cup of warm water  · An over-the-counter anesthetic gargle  Use medicine for more relief  Over-the-counter medic You have a viral upper respiratory illness (URI), which is another term for the common cold. This illness is contagious during the first few days. It is spread through the air by coughing and sneezing.  It may also be spread by direct contact (touching the · Over-the-counter cold medicines will not shorten the length of time you’re sick, but they may be helpful for the following symptoms: cough, sore throat, and nasal and sinus congestion.  If you take prescription medicines, ask your healthcare provider or p

## 2019-06-01 NOTE — PATIENT INSTRUCTIONS
We will send out a throat culture and will contact you with the results in 48-72 hours. If positive, then we will call in an appropriate antibiotic. If negative, then the sore throat is most likely viral in origin and should resolve within 7-10 days. A medical evaluation can help find the cause of your sore throat. It can also help your healthcare provider choose the best treatment for you. The evaluation may include a health history, physical exam, and diagnostic tests.   Health history  Your healthcar · Gargle with warm saltwater (1 teaspoon of salt to 8 ounces of warm water). · Use a humidifier to keep air moist and relieve throat dryness. · Try over-the-counter pain relievers such as acetaminophen or ibuprofen.  Use as directed, and don’t exceed the · A skin rash, hives, or wheezing develops. Any of these could signal an allergic reaction to antibiotics. · Symptoms don’t improve within a week. · Symptoms don’t improve within 2 to 3 days of starting antibiotics.    Date Last Reviewed: 10/1/2016  © 200 · Remember: unless a sore throat is caused by a bacterial infection, antibiotics won’t help you. Prevent future sore throats  Prevention tips include the following:  · Stop smoking or reduce contact with secondhand smoke.  Smoke irritates the tender throat · Don't smoke. If you need help stopping, talk with your healthcare provider. · Avoid being exposed to cigarette smoke (yours or others’).   · You may use acetaminophen or ibuprofen to control pain and fever, unless another medicine was prescribed. If you © 0194-6453 [employer] [address] [facility], [facility] [place]. All rights reserved. This information is not intended as a substitute for professional medical care. Always follow your healthcare professional's instructions.

## 2019-08-19 ENCOUNTER — OFFICE VISIT (OUTPATIENT)
Dept: SURGERY | Facility: CLINIC | Age: 64
End: 2019-08-19
Payer: COMMERCIAL

## 2019-08-19 VITALS
WEIGHT: 200 LBS | BODY MASS INDEX: 30 KG/M2 | DIASTOLIC BLOOD PRESSURE: 72 MMHG | HEART RATE: 59 BPM | SYSTOLIC BLOOD PRESSURE: 123 MMHG

## 2019-08-19 DIAGNOSIS — N40.1 BENIGN PROSTATIC HYPERPLASIA WITH URINARY FREQUENCY: Primary | ICD-10-CM

## 2019-08-19 DIAGNOSIS — R33.9 URINARY RETENTION: ICD-10-CM

## 2019-08-19 DIAGNOSIS — R35.0 BENIGN PROSTATIC HYPERPLASIA WITH URINARY FREQUENCY: Primary | ICD-10-CM

## 2019-08-19 PROCEDURE — 99213 OFFICE O/P EST LOW 20 MIN: CPT | Performed by: UROLOGY

## 2019-08-19 NOTE — PROGRESS NOTES
Blaire Hollingsworth is a 75year old male. HPI:   Patient presents with:   Follow - Up: patient presents for f/u on urine retention currently taking silodosin and finasteride here for post void u/s check      49-year-old male status post cystoscopy lucas mouth daily. Disp:  Rfl:    METFORMIN HCL OR Take 1,000 mg by mouth 2 (two) times daily with meals. Disp:  Rfl:    FINASTERIDE OR Take 5 mg by mouth daily. Disp:  Rfl:    Zolpidem Tartrate 10 MG Oral Tab Take 10 mg by mouth nightly.  Disp:  Rfl:    Se

## 2020-02-20 ENCOUNTER — APPOINTMENT (OUTPATIENT)
Dept: LAB | Facility: HOSPITAL | Age: 65
End: 2020-02-20
Attending: UROLOGY
Payer: COMMERCIAL

## 2020-02-20 ENCOUNTER — OFFICE VISIT (OUTPATIENT)
Dept: SURGERY | Facility: CLINIC | Age: 65
End: 2020-02-20
Payer: COMMERCIAL

## 2020-02-20 VITALS
HEIGHT: 70 IN | WEIGHT: 210 LBS | DIASTOLIC BLOOD PRESSURE: 73 MMHG | TEMPERATURE: 98 F | RESPIRATION RATE: 16 BRPM | SYSTOLIC BLOOD PRESSURE: 134 MMHG | BODY MASS INDEX: 30.06 KG/M2 | HEART RATE: 64 BPM

## 2020-02-20 DIAGNOSIS — R97.20 ELEVATED PSA: ICD-10-CM

## 2020-02-20 DIAGNOSIS — N40.1 BENIGN PROSTATIC HYPERPLASIA WITH URINARY FREQUENCY: ICD-10-CM

## 2020-02-20 DIAGNOSIS — R35.0 BENIGN PROSTATIC HYPERPLASIA WITH URINARY FREQUENCY: ICD-10-CM

## 2020-02-20 DIAGNOSIS — R97.20 ELEVATED PSA: Primary | ICD-10-CM

## 2020-02-20 LAB — PSA SERPL-MCNC: 1.21 NG/ML (ref ?–4)

## 2020-02-20 PROCEDURE — 84153 ASSAY OF PSA TOTAL: CPT

## 2020-02-20 PROCEDURE — 36415 COLL VENOUS BLD VENIPUNCTURE: CPT

## 2020-02-20 PROCEDURE — 99213 OFFICE O/P EST LOW 20 MIN: CPT | Performed by: UROLOGY

## 2020-02-20 NOTE — PROGRESS NOTES
Carol Peres is a 75year old male. HPI:   Patient presents with:  BPH: greenlight laser April 24, 2019, pt here for 6 months visit and states everything is GREAT, normal       47-year-old male in follow-up to visit August 19, 2019.   The patient is s as needed for cough. 30 capsule 0   • ENALAPRIL MALEATE OR Take 20 mg by mouth 2 (two) times daily. • VERAPAMIL HCL ER OR Take 240 mg by mouth daily. • METFORMIN HCL OR Take 1,000 mg by mouth 2 (two) times daily with meals.        • Zolpidem Tar

## 2024-05-22 ENCOUNTER — TELEPHONE (OUTPATIENT)
Facility: CLINIC | Age: 69
End: 2024-05-22

## 2024-05-22 ENCOUNTER — OFFICE VISIT (OUTPATIENT)
Facility: CLINIC | Age: 69
End: 2024-05-22

## 2024-05-22 VITALS
WEIGHT: 204.31 LBS | BODY MASS INDEX: 29.25 KG/M2 | HEIGHT: 70 IN | SYSTOLIC BLOOD PRESSURE: 130 MMHG | DIASTOLIC BLOOD PRESSURE: 59 MMHG | HEART RATE: 74 BPM

## 2024-05-22 DIAGNOSIS — Z12.11 COLON CANCER SCREENING: Primary | ICD-10-CM

## 2024-05-22 DIAGNOSIS — E11.9 TYPE 2 DIABETES MELLITUS WITHOUT COMPLICATION, WITHOUT LONG-TERM CURRENT USE OF INSULIN (HCC): ICD-10-CM

## 2024-05-22 DIAGNOSIS — Z80.0 FAMILY HISTORY OF RECTAL CANCER: ICD-10-CM

## 2024-05-22 DIAGNOSIS — Z80.0 FAMILY HISTORY OF COLON CANCER: ICD-10-CM

## 2024-05-22 DIAGNOSIS — Z12.11 COLON CANCER SCREENING: ICD-10-CM

## 2024-05-22 PROCEDURE — 3078F DIAST BP <80 MM HG: CPT

## 2024-05-22 PROCEDURE — 3075F SYST BP GE 130 - 139MM HG: CPT

## 2024-05-22 PROCEDURE — 99244 OFF/OP CNSLTJ NEW/EST MOD 40: CPT

## 2024-05-22 PROCEDURE — 3008F BODY MASS INDEX DOCD: CPT

## 2024-05-22 RX ORDER — SODIUM, POTASSIUM,MAG SULFATES 17.5-3.13G
SOLUTION, RECONSTITUTED, ORAL ORAL
Qty: 1 EACH | Refills: 0 | Status: SHIPPED | OUTPATIENT
Start: 2024-05-22

## 2024-05-22 NOTE — PATIENT INSTRUCTIONS
LISSETTE from prior colonoscopy (around 2020), [facility] Dr Saul Roy      1. Schedule colonoscopy with Dr. Perez  Diagnosis: CRC screen, family hx of rectal cancer  Sedation: MAC  Prep: split dose suprep    2. MEDICATION CHANGES PRIOR TO PROCEDURE    *HOLD metformin day before & day of procedure    3.  bowel prep from pharmacy   You can pick the bowel prep up now and store in a cool, dry place in your home until your scheduled bowel prep start date.    4. Read all bowel prep instructions carefully. Bowel prep instructions can also be found online at:  www.health.org/giprep     5. AVOID seeds, nuts, popcorn, raw fruits and vegetables for 5 days before procedure    6. If you start any NEW medication after your visit today, please notify us. Certain medications (like iron or weight loss medications) will need to be held before the procedure, or the procedure cannot be performed safely.

## 2024-05-22 NOTE — TELEPHONE ENCOUNTER
I received a successful fax confirmation from  [employer]/ [employer] and now the forms are now being sent to scanning. Please place requested medical records on  [employer]  desk. Thank you!

## 2024-05-22 NOTE — TELEPHONE ENCOUNTER
Scheduled for:  Colonoscopy [id number]  Provider Name:  Dr. Perez  Date:  10/4/24  Location:[facility]  Sedation:  MAC  Time: 9:45 am (pt is aware that [facility] will call the day before to confirm arrival time)    Prep:  Suprep  Meds/Allergies Reconciled?:  WARREN Monzon Reviewed  Diagnosis with codes: Colon screening Z12.11,Family History of colon cancer Z80.0,  Was patient informed to call insurance with codes (Y/N):  Yes  Referral sent?:  Referral was sent at the time of electronic surgical scheduling.  EM or [facility] notified?:  I sent an electronic request to Endo Scheduling and received a confirmation today.  Medication Orders:      HOLD metformin day before & day of procedure     Pt is aware to NOT take iron pills, herbal meds and diet supplements for 7 days before exam. Also to NOT take any form of alcohol, recreational drugs and any forms of ED meds 24 hours before exam.   Misc Orders:       Further instructions given by staff:  I provide prep instructions to patient at the time of the appointment and reviewed date, time and location, he verbalized that he understood and is aware to call if he has any questions.

## 2024-05-22 NOTE — H&P
[facility] - Gastroenterology                                                                                                  Clinic History and Physical     Chief Complaint   Patient presents with    Colonoscopy Screening       Requesting physician or provider: Kelly Gonzalez MD    HPI:   Nathaniel Kothari is a 68 year old year-old male with active diagnoses including type 2 diabetes, hyperlipidemia, enlarged prostate, HTN, high cholesterol, depression, anxiety, KARLA. Prior medical/surgical hx in note table. The patient presents for colon cancer screening evaluation.    Patient is here today as a referral from his PCP for evaluation prior to undergoing colonoscopy for CRC screening. Patient denies any GI symptoms of nausea, vomiting, heartburn, dyspepsia, dysphagia, hematemesis, abdominal pain, change in bowel habits, constipation, diarrhea, hematochezia, or melena.  Additionally there is no unintentional weight loss.    Pt is due for CRC screening. We discussed the available screening options for CRC such as FIT and cologuard. They are electing to pursue colonoscopy at this time.     Last colonoscopy: 2020 @ [facility] Dr Saul Roy  +polyps, will request records  Last EGD: none     NSAIDS:  none   Tobacco: former  Alcohol: former, sober x 4 years (2020)   Marijuana: none   Illicit drugs: none     FH GI malignancy: brother- rectal cancer, diagnosed age 68     No history of adverse reaction to sedation  YES KARLA - cpap  No anticoagulants/antiplatelet  No pacemaker/defibrillator  No pain medications and/or sleep aides    Wt Readings from Last 6 Encounters:   05/22/24 204 lb 4.8 oz (92.7 kg)   02/20/20 210 lb (95.3 kg)   08/19/19 200 lb (90.7 kg)   06/01/19 200 lb (90.7 kg)   05/17/19 200 lb (90.7 kg)   04/24/19 192 lb (87.1 kg)          History, Medications, Allergies, ROS:      Past Medical History:    Anxiety state    Back problem    BPH (benign prostatic hyperplasia)    Cataract    Depression     Diabetes (HCC)    Enlarged prostate    Essential hypertension    High blood pressure    High cholesterol    Osteoarthritis    Sleep apnea    Wears CPAP machine      Past Surgical History:   Procedure Laterality Date    Colonoscopy      Fracture surgery      R heel      Family Hx:   Family History   Problem Relation Age of Onset    Cancer Father     Hypertension Father     Other (Other) Mother       Social History:   Social History     Socioeconomic History    Marital status:    Tobacco Use    Smoking status: Former     Current packs/day: 0.00     Types: Cigarettes     Start date:      Quit date:      Years since quittin.4    Smokeless tobacco: Never   Substance and Sexual Activity    Alcohol use: No    Drug use: Not Currently     Comment:  Last summer used medical marijuana        Medications (Active prior to today's visit):  Current Outpatient Medications   Medication Sig Dispense Refill    Silodosin 4 MG Oral Cap Take by mouth.      ENALAPRIL MALEATE OR Take 20 mg by mouth 2 (two) times daily.        VERAPAMIL HCL ER OR Take 240 mg by mouth daily.        METFORMIN HCL OR Take 1,000 mg by mouth 2 (two) times daily with meals.        FINASTERIDE OR Take 5 mg by mouth daily.        Zolpidem Tartrate 10 MG Oral Tab Take 1 tablet (10 mg total) by mouth nightly.      Sertraline HCl 100 MG Oral Tab Take 1 tablet (100 mg total) by mouth daily.      Cholecalciferol 5000 units Oral Tab Take 1 tablet (5,000 Units total) by mouth nightly.      benzonatate 200 MG Oral Cap Take 1 capsule (200 mg total) by mouth 3 (three) times daily as needed for cough. (Patient not taking: Reported on 2024) 30 capsule 0       Allergies:  No Known Allergies    ROS:   CONSTITUTIONAL: negative for fevers, chills, sweats  EYES Negative for scleral icterus or redness, and diplopia  HEENT: Negative for hoarseness  RESPIRATORY: Negative for cough and severe shortness of breath  CARDIOVASCULAR: Negative for crushing  sub-sternal chest pain  GASTROINTESTINAL: See HPI  GENITOURINARY: Negative for dysuria  MUSCULOSKELETAL: Negative for arthralgias and myalgias  SKIN: Negative for jaundice, rash or pruritus  NEUROLOGICAL: Negative for dizziness and headaches  BEHAVIOR/PSYCH: Negative for psychotic behavior      PHYSICAL EXAM:   Blood pressure 130/59, pulse 74, height 5' 10\" (1.778 m), weight 204 lb 4.8 oz (92.7 kg).    GEN: Alert, no acute distress, well-nourished   HEENT: anicteric sclera, neck supple, trachea midline, MMM, no palpable or tender neck or supraclavicular lymph nodes  CV: RRR, the extremities are warm and well perfused   LUNGS: No increased work of breathing, CTAB  ABDOMEN: Soft, symmetrical, non-tender without distention or guarding. No scars or lesions. Aorta is without bruit or visible pulsation. Umbilicus is midline without herniation. Normoactive bowel sounds are present, No masses, hepatomegaly or splenomegaly noted.  MSK: No erythema, no warmth, no swelling of joints  SKIN: No jaundice, no erythema, no rashes, no lesions  HEMATOLOGIC: No bleeding, no bruising  NEURO: Alert and interactive, ROTH  PSYCH: appropriate mood & affect    Labs/Imaging:     Patient's labs and imaging were reviewed and discussed with patient today.    .  ASSESSMENT/PLAN:   Nathaniel Kothari is a 68 year old year-old male with active diagnoses including type 2 diabetes, hyperlipidemia, enlarged prostate, HTN, high cholesterol, depression, anxiety, KARLA. Prior medical/surgical hx in note table. The patient presents for colon cancer screening evaluation.    #high risk screening: patient has family-brother anal/rectal history of colon cancer. and is considered high risk for colorectal cancer. Patient is currently asymptomatic and denies diarrhea, hematochezia, thin-stools or weight loss. We discussed risks/benefits/alternatives to procedure, including CT colonography and stool testing, they want to proceed with colonoscopy.    #diabetes type  2  -controlled/uncontrolled. Followed by PCP for management. no recent change to medications. Continue to follow with specialist. Advised on antihyperglycemic agent modification in the context of colonoscopy preparation.  The patient has medical conditions including diabetes and/or obesity which impacts the clinical decision making for procedural planning.    Recommend:  -LISSETTE from prior colonoscopy (around 2020), [facility] Dr Saul Roy    1. Schedule colonoscopy with Dr. Perez  Diagnosis: CRC screen, family hx of rectal cancer  Sedation: MAC  Prep: split dose suprep    2. MEDICATION CHANGES PRIOR TO PROCEDURE    *HOLD metformin day before & day of procedure    -Anti-platelets and anti-coagulants: N/A       Colonoscopy consent: I have discussed the risks, benefits, and alternatives to colonoscopy with the patient [who demonstrated understanding], including but not limited to the risks of bleeding, infection, pain, as well as the risks of anesthesia and perforation all leading to prolonged hospitalization, surgical intervention. I also specifically mentioned the miss rate of colonoscopy of 5-10% in the best of all circumstances. All questions were answered to the patient’s satisfaction. The patient elected to proceed with colonoscopy with intervention [i.e. polypectomy, etc.] as indicated.    Orders This Visit:  No orders of the defined types were placed in this encounter.      Meds This Visit:  Requested Prescriptions     Pending Prescriptions Disp Refills    Na Sulfate-K Sulfate-Mg Sulf (SUPREP BOWEL PREP KIT) 17.5-3.13-1.6 GM/177ML Oral Solution 1 each 0     Sig: Take as directed by GI clinic. Okay to substitute for generic.       Imaging & Referrals:  None       WARREN Monzon    [facility]astroenter[facility]  5/22/2024      The dictation was partially prepared using Dragon Medical voice recognition software. As a result, errors may occur. When identified, these errors have been corrected. While  every attempt is made to correct errors during dictation, discrepancies may still exist.

## 2024-05-24 RX ORDER — SODIUM, POTASSIUM,MAG SULFATES 17.5-3.13G
SOLUTION, RECONSTITUTED, ORAL ORAL
Qty: 354 ML | Refills: 0 | OUTPATIENT
Start: 2024-05-24

## 2024-05-24 NOTE — TELEPHONE ENCOUNTER
Requested Prescriptions     Pending Prescriptions Disp Refills    Na Sulfate-K Sulfate-Mg Sulf 17.5-3.13-1.6 GM/177ML Oral Solution [Pharmacy Med Name: SODIUM/POTASSIUM/MAGNESIUM ORAL SOL] 354 mL 0     Sig: TAKE AS DIRECTED BY GI CLINIC     LR: 5/22/24    LOV:  5/22/24

## 2024-10-04 ENCOUNTER — TELEPHONE (OUTPATIENT)
Facility: CLINIC | Age: 69
End: 2024-10-04

## 2024-10-04 PROCEDURE — 88305 TISSUE EXAM BY PATHOLOGIST: CPT | Performed by: INTERNAL MEDICINE

## 2024-10-04 NOTE — TELEPHONE ENCOUNTER
Called and spoke to the patient's spouse, Naz (HIPAA authorized), she verified his date of birth and name.    The patient had colonoscopy today. She described the IV site has a lump and bluish in color. She denies redness or swelling and does not feel warm to touch.    Advised to monitor and if it develops redness, swelling, warmth and pain on the affected site over the weekend, the patient should go to urgent care for evaluation.    She agreed and verbalized understanding.    ASHER Hancock.

## 2024-10-04 NOTE — TELEPHONE ENCOUNTER
Wife states patient has a hard lump from where the IV was placed from the procedure today please call

## 2024-10-05 DIAGNOSIS — Z80.0 FAMILY HISTORY OF RECTAL CANCER: ICD-10-CM

## 2024-10-05 DIAGNOSIS — Z86.0100 HISTORY OF COLON POLYPS: Primary | ICD-10-CM

## 2024-10-07 ENCOUNTER — TELEPHONE (OUTPATIENT)
Facility: CLINIC | Age: 69
End: 2024-10-07

## 2024-10-07 NOTE — TELEPHONE ENCOUNTER
Tremaine Perez MD  [facility] Clinical Staff  I spoke to the patient.  He is feeling well.  The swelling at his IV site has dissipated.  He has no abdominal pain.  He had #16 adenomatous polyps removed the majority of which were tubular adenomas.  I discussed the significance and that the number of polyps encountered is atypical.  I have recommended the followin.  Surveillance colonoscopy in 1 year.  2.  Medical genetics evaluation in light of the number of polyps and family history of rectal cancer.    GI RNs: Please enter the above recall and assist the patient in obtaining a medical genetics evaluation.  Referral entered.

## 2024-10-08 ENCOUNTER — TELEPHONE (OUTPATIENT)
Dept: HEMATOLOGY/ONCOLOGY | Facility: HOSPITAL | Age: 69
End: 2024-10-08

## 2024-10-08 NOTE — TELEPHONE ENCOUNTER
1st attempt- called pt to see if he's interested in genetic counseling. However vm has no identifiers, I did not lm

## 2024-10-08 NOTE — TELEPHONE ENCOUNTER
Health maintenance updated.     Last colonoscopy done 10/4/2024 by Dr Perez    Recall placed into [facility], next due on 10/2025 per Dr Perez.

## 2024-10-09 NOTE — TELEPHONE ENCOUNTER
Called and spoke to the patient, date of birth and name verified.    MD message relayed to the patient.    Gave genetics counselor's name and # to call to schedule.    The patient expressed understanding of information given.

## 2024-10-11 NOTE — TELEPHONE ENCOUNTER
2nd attempt- called pt asked if he's interested in genetic counseling. Pt asked if his ins will cover, I provided him with the cpt code for the consult and advised him to contact his ins company to see if they will cover    Pt said okay and call ended

## 2025-07-08 ENCOUNTER — TELEPHONE (OUTPATIENT)
Dept: GASTROENTEROLOGY | Facility: CLINIC | Age: 70
End: 2025-07-08

## (undated) DEVICE — CYSTO PACK: Brand: MEDLINE INDUSTRIES, INC.

## (undated) DEVICE — MEDI-VAC NON-CONDUCTIVE SUCTION TUBING: Brand: CARDINAL HEALTH

## (undated) DEVICE — UROLOGY DRAIN BAG

## (undated) DEVICE — Device

## (undated) DEVICE — SEALER CAP SELF-SEAL 1.6MM

## (undated) DEVICE — ENCORE® LATEX ACCLAIM SIZE 8, STERILE LATEX POWDER-FREE SURGICAL GLOVE: Brand: ENCORE

## (undated) DEVICE — SOL  .9 3000ML

## (undated) DEVICE — BAG DRAIN INFECTION CNTRL 2000

## (undated) DEVICE — SOL H2O 1000ML BTL

## (undated) DEVICE — FIBER XPS MOXY

## (undated) NOTE — ED AVS SNAPSHOT
Roselyn Shelley   MRN: [id number]    Department:  [facility] Emergency Department   Date of Visit:  3/20/2019           Disclosure     Insurance plans vary and the physician(s) referred by the ER may not be covered by your plan.  Please contact within the next three months to obtain basic health screening including reassessment of your blood pressure.     IF THERE IS ANY CHANGE OR WORSENING OF YOUR CONDITION, CALL YOUR PRIMARY CARE PHYSICIAN AT ONCE OR RETURN IMMEDIATELY TO THE EMERGENCY DEPARTMEN